# Patient Record
Sex: FEMALE | Race: BLACK OR AFRICAN AMERICAN | NOT HISPANIC OR LATINO | Employment: UNEMPLOYED | URBAN - METROPOLITAN AREA
[De-identification: names, ages, dates, MRNs, and addresses within clinical notes are randomized per-mention and may not be internally consistent; named-entity substitution may affect disease eponyms.]

---

## 2019-01-18 ENCOUNTER — HOSPITAL ENCOUNTER (EMERGENCY)
Facility: HOSPITAL | Age: 59
Discharge: HOME | End: 2019-01-18
Attending: EMERGENCY MEDICINE
Payer: MEDICAID

## 2019-01-18 VITALS
BODY MASS INDEX: 41.68 KG/M2 | SYSTOLIC BLOOD PRESSURE: 170 MMHG | TEMPERATURE: 99 F | DIASTOLIC BLOOD PRESSURE: 81 MMHG | HEIGHT: 63 IN | HEART RATE: 85 BPM | WEIGHT: 235.25 LBS | RESPIRATION RATE: 16 BRPM | OXYGEN SATURATION: 98 %

## 2019-01-18 DIAGNOSIS — R35.0 URINARY FREQUENCY: ICD-10-CM

## 2019-01-18 DIAGNOSIS — R10.84 GENERALIZED ABDOMINAL PAIN: ICD-10-CM

## 2019-01-18 DIAGNOSIS — N30.01 ACUTE CYSTITIS WITH HEMATURIA: Primary | ICD-10-CM

## 2019-01-18 DIAGNOSIS — K30 INDIGESTION: ICD-10-CM

## 2019-01-18 DIAGNOSIS — R11.0 NAUSEA: ICD-10-CM

## 2019-01-18 PROBLEM — L30.0 NUMMULAR ECZEMA: Status: ACTIVE | Noted: 2018-10-23

## 2019-01-18 PROBLEM — E11.9 TYPE 2 DIABETES MELLITUS, WITHOUT LONG-TERM CURRENT USE OF INSULIN: Status: ACTIVE | Noted: 2018-05-03

## 2019-01-18 PROBLEM — L44.1 LICHEN NITIDUS: Status: ACTIVE | Noted: 2018-10-23

## 2019-01-18 LAB
BACTERIA #/AREA URNS AUTO: ABNORMAL /HPF
BILIRUB UR QL STRIP: NEGATIVE
CLARITY UR REFRACT.AUTO: ABNORMAL
COLOR UR AUTO: YELLOW
GLUCOSE UR QL STRIP: NEGATIVE
HGB UR QL STRIP: ABNORMAL
KETONES UR QL STRIP: NEGATIVE
LEUKOCYTE ESTERASE UR QL STRIP: NEGATIVE
MICROSCOPIC COMMENT: ABNORMAL
NITRITE UR QL STRIP: NEGATIVE
PH UR STRIP: 6 [PH] (ref 5–8)
PROT UR QL STRIP: NEGATIVE
RBC #/AREA URNS AUTO: 3 /HPF (ref 0–4)
SP GR UR STRIP: 1.02 (ref 1–1.03)
SQUAMOUS #/AREA URNS AUTO: 9 /HPF
URN SPEC COLLECT METH UR: ABNORMAL
UROBILINOGEN UR STRIP-ACNC: <2 EU/DL
WBC #/AREA URNS AUTO: 2 /HPF (ref 0–5)

## 2019-01-18 PROCEDURE — 99284 EMERGENCY DEPT VISIT MOD MDM: CPT | Mod: ER

## 2019-01-18 PROCEDURE — 81000 URINALYSIS NONAUTO W/SCOPE: CPT | Mod: ER

## 2019-01-18 PROCEDURE — 25000003 PHARM REV CODE 250: Mod: ER | Performed by: NURSE PRACTITIONER

## 2019-01-18 RX ORDER — MONTELUKAST SODIUM 10 MG/1
10 TABLET ORAL DAILY
COMMUNITY
Start: 2019-01-15

## 2019-01-18 RX ORDER — PANTOPRAZOLE SODIUM 40 MG/1
40 TABLET, DELAYED RELEASE ORAL DAILY
COMMUNITY
Start: 2019-01-15 | End: 2020-01-15

## 2019-01-18 RX ORDER — HYDROXYZINE PAMOATE 25 MG/1
25 CAPSULE ORAL 3 TIMES DAILY PRN
COMMUNITY
Start: 2019-01-15 | End: 2019-02-14

## 2019-01-18 RX ORDER — FLUTICASONE PROPIONATE AND SALMETEROL 113; 14 UG/1; UG/1
1 POWDER, METERED RESPIRATORY (INHALATION) 2 TIMES DAILY
COMMUNITY
Start: 2019-01-15

## 2019-01-18 RX ORDER — AMLODIPINE BESYLATE 10 MG/1
10 TABLET ORAL DAILY
COMMUNITY
Start: 2019-01-15 | End: 2020-01-15

## 2019-01-18 RX ORDER — LOSARTAN POTASSIUM 100 MG/1
100 TABLET ORAL DAILY
COMMUNITY
Start: 2019-01-15 | End: 2020-01-15

## 2019-01-18 RX ORDER — PROMETHAZINE HYDROCHLORIDE 25 MG/1
25 TABLET ORAL EVERY 6 HOURS PRN
Qty: 15 TABLET | Refills: 0 | Status: SHIPPED | OUTPATIENT
Start: 2019-01-18

## 2019-01-18 RX ORDER — NITROFURANTOIN 25; 75 MG/1; MG/1
100 CAPSULE ORAL 2 TIMES DAILY
Qty: 10 CAPSULE | Refills: 0 | Status: SHIPPED | OUTPATIENT
Start: 2019-01-18 | End: 2019-01-23

## 2019-01-18 RX ORDER — ALBUTEROL SULFATE 90 UG/1
2 AEROSOL, METERED RESPIRATORY (INHALATION) EVERY 6 HOURS PRN
COMMUNITY
Start: 2019-01-15

## 2019-01-18 RX ORDER — DICLOFENAC SODIUM 10 MG/G
2 GEL TOPICAL 4 TIMES DAILY PRN
COMMUNITY
Start: 2019-01-15 | End: 2019-10-18

## 2019-01-18 RX ORDER — CHLORTHALIDONE 25 MG/1
25 TABLET ORAL DAILY
COMMUNITY
Start: 2019-01-15 | End: 2020-01-15

## 2019-01-18 RX ORDER — LORATADINE 10 MG/1
10 TABLET ORAL DAILY PRN
COMMUNITY
Start: 2019-01-15 | End: 2020-01-15

## 2019-01-18 RX ADMIN — DICYCLOMINE HYDROCHLORIDE 50 ML: 10 SOLUTION ORAL at 01:01

## 2019-01-18 NOTE — ED PROVIDER NOTES
"Encounter Date: 1/18/2019       History     Chief Complaint   Patient presents with    Abdominal Pain     Onset last night. Generalized pain. Denies N/V/D.      The history is provided by the patient.   Abdominal Pain   The current episode started yesterday (began last night). The onset of the illness was gradual. The problem has not changed since onset.The abdominal pain is generalized. The abdominal pain radiates to the epigastric region. The severity of the abdominal pain is 6/10. The abdominal pain is relieved by nothing. The other symptoms of the illness include nausea. The other symptoms of the illness do not include fever, shortness of breath, vomiting, diarrhea or dysuria.   Nausea began yesterday (since last night).     The patient has not had a change in bowel habit. Additional symptoms associated with the illness include heartburn and frequency. Symptoms associated with the illness do not include chills, anorexia, diaphoresis, constipation, hematuria or back pain. Significant associated medical issues include GERD and diabetes. Significant associated medical issues do not include inflammatory bowel disease, liver disease, diverticulitis or cardiac disease.   Last bowel movement was last night and she reports that it was "normal".  Last oral intake was last night for dinner in which she had a hot dog and fries.  She denies any further complaints or concerns.       PCP:     [facility]      Review of patient's allergies indicates:   Allergen Reactions    Latex, natural rubber Rash    Doxycycline Hives     Past Medical History:   Diagnosis Date    Asthma     Dyshidrotic eczema 7/29/2015    GERD (gastroesophageal reflux disease)     Hyperlipidemia     Hypertension     Lichen nitidus 10/23/2018    Obesity     Osteoarthritis 2/11/2016    Type 2 diabetes mellitus, without long-term current use of insulin 5/3/2018     History reviewed. No pertinent surgical history.  History reviewed. No " pertinent family history.  Social History     Tobacco Use    Smoking status: Never Smoker    Smokeless tobacco: Never Used   Substance Use Topics    Alcohol use: No    Drug use: No     Review of Systems   Constitutional: Negative for chills, diaphoresis and fever.   HENT: Negative for congestion, postnasal drip and sore throat.    Eyes: Negative for visual disturbance.   Respiratory: Negative for cough, chest tightness, shortness of breath and wheezing.    Cardiovascular: Negative for chest pain and palpitations.   Gastrointestinal: Positive for abdominal pain, heartburn and nausea. Negative for anorexia, constipation, diarrhea and vomiting.        Positive for indigestion.   Genitourinary: Positive for frequency. Negative for difficulty urinating, dysuria and hematuria.   Musculoskeletal: Negative for back pain and neck pain.   Skin: Negative for color change and rash.   Neurological: Positive for headaches. Negative for dizziness, weakness and light-headedness.   Hematological: Does not bruise/bleed easily.       Physical Exam     Initial Vitals [01/18/19 1305]   BP Pulse Resp Temp SpO2   (!) 157/78 87 16 99.2 °F (37.3 °C) 99 %      MAP       --         Physical Exam    Nursing note and vitals reviewed.  Constitutional: She appears well-developed and well-nourished. She is Obese . She is cooperative. She does not appear ill. No distress.   HENT:   Head: Normocephalic and atraumatic.   Right Ear: Hearing, tympanic membrane, external ear and ear canal normal.   Left Ear: Hearing, tympanic membrane, external ear and ear canal normal.   Nose: Nose normal.   Mouth/Throat: Uvula is midline, oropharynx is clear and moist and mucous membranes are normal.   Eyes: Conjunctivae, EOM and lids are normal. Pupils are equal, round, and reactive to light.   Neck: Trachea normal and normal range of motion. Neck supple.   Cardiovascular: Normal rate, regular rhythm, intact distal pulses and normal pulses.   Pulmonary/Chest:  Effort normal and breath sounds normal. No accessory muscle usage. No respiratory distress. She has no wheezes. She has no rhonchi. She has no rales.   Abdominal: Soft. Bowel sounds are normal. She exhibits no distension and no mass. There is no tenderness (no reproducible tenderness on palpation - patient has subjective complaints of indigestion and generalized abdominal discomfort). There is no rigidity, no rebound, no guarding and no CVA tenderness. No hernia.   Musculoskeletal: Normal range of motion. She exhibits no edema or tenderness.   Neurological: She is alert and oriented to person, place, and time. She has normal strength. No sensory deficit. Gait normal. GCS eye subscore is 4. GCS verbal subscore is 5. GCS motor subscore is 6.   Neurovascular intact to all extremities.    Skin: Skin is warm, dry and intact. Capillary refill takes less than 2 seconds. No rash noted.   Normal color and turgor.    Psychiatric: She has a normal mood and affect. Her speech is normal and behavior is normal. Cognition and memory are normal.         ED Course   Procedures     ED Abnormal Lab Results:   Labs Reviewed   URINALYSIS - Abnormal; Notable for the following components:       Result Value    Appearance, UA Hazy (*)     Occult Blood UA 1+ (*)     All other components within normal limits   URINALYSIS MICROSCOPIC - Abnormal; Notable for the following components:    Bacteria, UA Moderate (*)     All other components within normal limits       ED Lab Results:   Results for orders placed or performed during the hospital encounter of 01/18/19   Urinalysis Clean Catch   Result Value Ref Range    Specimen UA Urine, Clean Catch     Color, UA Yellow Yellow, Straw, Aruna    Appearance, UA Hazy (A) Clear    pH, UA 6.0 5.0 - 8.0    Specific Gravity, UA 1.020 1.005 - 1.030    Protein, UA Negative Negative    Glucose, UA Negative Negative    Ketones, UA Negative Negative    Bilirubin (UA) Negative Negative    Occult Blood UA 1+ (A)  "Negative    Nitrite, UA Negative Negative    Urobilinogen, UA <2.0 <2.0 EU/dL    Leukocytes, UA Negative Negative   Urinalysis Microscopic   Result Value Ref Range    RBC, UA 3 0 - 4 /hpf    WBC, UA 2 0 - 5 /hpf    Bacteria, UA Moderate (A) None-Occ /hpf    Squam Epithel, UA 9 /hpf    Microscopic Comment SEE COMMENT        ED Medications:   Medications   GI cocktail (mylanta 30 mL, lidocaine 2 % viscous 10 mL, dicyclomine 10 mL) 50 mL (50 mLs Oral Given 1/18/19 1358)         ED Course Vitals  Vitals:    01/18/19 1305   BP: (!) 157/78   BP Location: Left arm   Patient Position: Sitting   Pulse: 87   Resp: 16   Temp: 99.2 °F (37.3 °C)   TempSrc: Oral   SpO2: 99%   Weight: 106.7 kg (235 lb 3.7 oz)   Height: 5' 3" (1.6 m)         1420 HOURS RE-EVALUATION & DISPOSITION:   Reassessment at the time of disposition demonstrates that the patient is resting comfortably in no acute distress. Ms. Penaloza reports that she is feeling much better following administration of the GI Cocktail.  She has remained hemodynamically stable throughout the entire ED visit and is without objective evidence for acute process requiring urgent intervention or hospitalization. I discussed test results and provided counseling to patient with regard to condition, the treatment plan, specific conditions for return, and the importance of follow up.  Answered questions at this time. The patient is stable for discharge.                   Medical Decision Making:   History:   Old Records Summarized: records from clinic visits.  Clinical Tests:   Lab Tests: Ordered and Reviewed                      Clinical Impression:       ICD-10-CM ICD-9-CM   1. Acute cystitis with hematuria N30.01 595.0   2. Generalized abdominal pain R10.84 789.07   3. Urinary frequency R35.0 788.41   4. Indigestion K30 536.8   5. Nausea R11.0 787.02           Disposition:   Disposition: Discharged  Condition: Stable  I discussed with patient that the evaluation in the emergency " department does not suggest any emergent or life threatening medical condition requiring immediate intervention beyond what was provided in the ED, and I believe patient is safe for discharge.  Regardless, an unremarkable evaluation in the ED does not preclude the development or presence of a serious of life threatening condition. As such, patient was instructed to return immediately for any worsening or change in current symptoms. I also discussed the results of my evaluation and diagnosis with patient and she concurs with the evaluation and management plan.  Detailed written and verbal instructions provided to patient and she expressed a verbal understanding of the discharge instructions and overall management plan. Reiterated the importance of medication administration and safety and advised patient to follow up with primary care provider in 3-5 days or sooner if needed.  Also advised patient to return to the ER for any complications.     Regarding ABDOMINAL PAIN, I recommended that the patient: Sip water or other clear fluids; avoid solid food for the first few hours after vomiting or diarrhea; if vomiting, wait 6 hours, and then eat small amounts of mild foods such as rice, applesauce, or crackers; avoid dairy products; avoid citrus, high-fat foods, fried or greasy foods, tomato products, caffeine, alcohol, and carbonated beverages;  avoid aspirin, ibuprofen or other anti-inflammatory medications, and narcotic pain medications unless prescribed.  In regards to prevention, I encouraged patient to:  Avoid fatty or greasy foods; drink plenty of water each day; eat small meals more frequently; exercise regularly; limit foods that produce gas; make sure meals are well-balanced and high in fiber and include plenty of fruits and vegetables.    For URINARY TRACT INFECTION, I instructed patient to avoid holding in urine and recommended that she urinate when she feels the urge.  Also advised patient to drink plenty of  liquids and reminded patient that she may need to drink more fluids than usual to help flush out the bacteria. Instructed patient to avoid alcohol, caffeine, and citrus juices as these substances can irritate your bladder and increase your symptoms.  Also recommended that patient apply heat to lower abdomen for 20 to 30 minutes every two hours to help decrease discomfort and pressure in the bladder.    Regarding GERD/INDIGESTION, I recommended that the patient: maintain a healthy weight; avoid lying down after meals; avoid eating late at night; elevate the head of the bed by 6 inches; avoid wearing tight-fitting clothes; avoid foods that irritate the stomach (alcohol, fat, chocolate, caffeine, and spearmint or peppermint); talk to primary care provider if taking any medications that can make GERD symptoms worse (calcium channel blockers, theophylline, and anticholinergic medications); begin an exercise program; stop-smoking if applicable; limit alcohol intake to no more than 2 drinks a day; take medications exactly as directed; and avoid over-the-counter nonsteroidal anti-inflammatory drugs, such as aspirin and ibuprofen. Instructed patient to contact primary care provider or return to the emergency department if any of the following signs or symptoms are present: trouble swallowing; pain when swallowing; feeling of food caught in chest or throat; pain in the neck, chest, or back; heartburn that causes emesis; hematemesis; black or tarry stools; increase in salivation; weight loss of more than 3% to 5% of total body weight in a month; hoarseness or sore throat that won't go away; and choking, coughing, or wheezing.    For NAUSEA/VOMITING, I advised patient on importance of staying well hydrated; eating frequent, small amounts of clear liquids; avoid solid foods until there has been no vomiting for six hours, and then work slowly back to a normal diet; and to use an OTC bismuth stomach remedy for upset stomach,  nausea, indigestion, and diarrhea. We discussed signs and symptoms of dehydration and possible causes of N/V with patient (viral infections, medications, migraine headaches, food poisoning, allergies, and peptic ulcer disease).  Reiterated the importance of following up with primary care provider if no improvement is noted within 72 hours.              Follow-up Information     Schedule an appointment as soon as possible for a visit  with PRIMARY CARE PROVIDER.    Why:  For follow up                 Current Discharge Medication List      START taking these medications    Details   nitrofurantoin, macrocrystal-monohydrate, (MACROBID) 100 MG capsule Take 1 capsule (100 mg total) by mouth 2 (two) times daily. for 5 days  Qty: 10 capsule, Refills: 0      promethazine (PHENERGAN) 25 MG tablet Take 1 tablet (25 mg total) by mouth every 6 (six) hours as needed for Nausea.  Qty: 15 tablet, Refills: 0        Ankit Nash NP  01/18/19 8862

## 2019-01-29 ENCOUNTER — HOSPITAL ENCOUNTER (EMERGENCY)
Facility: HOSPITAL | Age: 59
Discharge: HOME | End: 2019-01-29
Attending: EMERGENCY MEDICINE
Payer: MEDICAID

## 2019-01-29 VITALS
WEIGHT: 242.06 LBS | DIASTOLIC BLOOD PRESSURE: 91 MMHG | HEART RATE: 104 BPM | HEIGHT: 63 IN | BODY MASS INDEX: 42.89 KG/M2 | SYSTOLIC BLOOD PRESSURE: 184 MMHG | TEMPERATURE: 100 F | RESPIRATION RATE: 20 BRPM | OXYGEN SATURATION: 99 %

## 2019-01-29 DIAGNOSIS — R05.9 COUGH: Primary | ICD-10-CM

## 2019-01-29 DIAGNOSIS — R09.81 NASAL CONGESTION: ICD-10-CM

## 2019-01-29 LAB
INFLUENZA A, MOLECULAR: NEGATIVE
INFLUENZA B, MOLECULAR: NEGATIVE
SPECIMEN SOURCE: NORMAL

## 2019-01-29 PROCEDURE — 94761 N-INVAS EAR/PLS OXIMETRY MLT: CPT | Mod: ER

## 2019-01-29 PROCEDURE — 99900035 HC TECH TIME PER 15 MIN (STAT): Mod: ER

## 2019-01-29 PROCEDURE — 25000242 PHARM REV CODE 250 ALT 637 W/ HCPCS: Mod: ER | Performed by: PHYSICIAN ASSISTANT

## 2019-01-29 PROCEDURE — 94760 N-INVAS EAR/PLS OXIMETRY 1: CPT | Mod: ER,59

## 2019-01-29 PROCEDURE — 87502 INFLUENZA DNA AMP PROBE: CPT | Mod: ER

## 2019-01-29 PROCEDURE — 99284 EMERGENCY DEPT VISIT MOD MDM: CPT | Mod: ER

## 2019-01-29 PROCEDURE — 94640 AIRWAY INHALATION TREATMENT: CPT | Mod: ER

## 2019-01-29 RX ORDER — FLUTICASONE PROPIONATE 50 MCG
2 SPRAY, SUSPENSION (ML) NASAL DAILY
Qty: 15 G | Refills: 0 | Status: SHIPPED | OUTPATIENT
Start: 2019-01-29

## 2019-01-29 RX ORDER — BENZONATATE 100 MG/1
100 CAPSULE ORAL 3 TIMES DAILY PRN
Qty: 12 CAPSULE | Refills: 0 | Status: SHIPPED | OUTPATIENT
Start: 2019-01-29 | End: 2019-02-08

## 2019-01-29 RX ORDER — IPRATROPIUM BROMIDE AND ALBUTEROL SULFATE 2.5; .5 MG/3ML; MG/3ML
3 SOLUTION RESPIRATORY (INHALATION)
Status: COMPLETED | OUTPATIENT
Start: 2019-01-29 | End: 2019-01-29

## 2019-01-29 RX ADMIN — IPRATROPIUM BROMIDE AND ALBUTEROL SULFATE 3 ML: .5; 3 SOLUTION RESPIRATORY (INHALATION) at 06:01

## 2019-01-30 NOTE — ED PROVIDER NOTES
History      Chief Complaint   Patient presents with    Nasal Congestion     nasal congestion and asthma flare up today. neb and inhaler today    Cough       Review of patient's allergies indicates:   Allergen Reactions    Latex, natural rubber Rash    Doxycycline Hives        HPI   HPI    1/29/2019, 7:38 PM   History obtained from the patient       History of Present Illness: Elaine Penaloza is a 58 y.o. female patient who presents to the Emergency Department for cough and nasal congestion with subj fever since last night. Pmh asthma.  Denies n/v or sob.  Symptoms are moderate in severity.     No further complaints or concerns at this time.           PCP: Primary Doctor No       Past Medical History:  Past Medical History:   Diagnosis Date    Asthma     Dyshidrotic eczema 7/29/2015    GERD (gastroesophageal reflux disease)     Hyperlipidemia     Hypertension     Lichen nitidus 10/23/2018    Obesity     Osteoarthritis 2/11/2016    Type 2 diabetes mellitus, without long-term current use of insulin 5/3/2018         Past Surgical History:  No past surgical history on file.        Family History:  No family history on file.        Social History:  Social History     Tobacco Use    Smoking status: Never Smoker    Smokeless tobacco: Never Used   Substance and Sexual Activity    Alcohol use: No    Drug use: No    Sexual activity: Not on file       ROS     Review of Systems   Constitutional: Positive for chills. Negative for fever.   HENT: Negative for sore throat.    Respiratory: Positive for cough and wheezing. Negative for shortness of breath.    Cardiovascular: Negative for chest pain, palpitations and leg swelling.   Gastrointestinal: Negative for nausea and vomiting.   Genitourinary: Negative for dysuria.   Musculoskeletal: Negative for back pain.   Skin: Negative for rash.   Neurological: Negative for syncope and weakness.   Hematological: Does not bruise/bleed easily.   All other systems  "reviewed and are negative.      Physical Exam      Initial Vitals [01/29/19 1753]   BP Pulse Resp Temp SpO2   (!) 184/91 (!) 115 20 99.5 °F (37.5 °C) 100 %      MAP       --         Physical Exam  Vital signs and nursing notes reviewed.  Constitutional: Patient is in NAD. Awake and alert. Well-developed and well-nourished.  Head: Atraumatic. Normocephalic.  Eyes: PERRL. EOM intact. Conjunctivae nl. No scleral icterus.  ENT: Mucous membranes are moist. Oropharynx is clear.  Neck: Supple. No JVD. No lymphadenopathy.  No meningismus  Cardiovascular: Regular rate and rhythm. No murmurs, rubs, or gallops. Distal pulses are 2+ and symmetric.  Pulmonary/Chest: No respiratory distress. Trace exp wheeze.  No rales, or rhonchi.  Abdominal: Soft. Non-distended. No TTP. No rebound, guarding, or rigidity. Good bowel sounds.  Genitourinary: No CVA tenderness  Musculoskeletal: Moves all extremities. No edema.   Skin: Warm and dry.  Neurological: Awake and alert. No acute focal neurological deficits are appreciated.  Psychiatric: Normal affect. Good eye contact. Appropriate in content.      ED Course          Procedures  ED Vital Signs:  Vitals:    01/29/19 1753 01/29/19 1847   BP: (!) 184/91    Pulse: (!) 115 104   Resp: 20 20   Temp: 99.5 °F (37.5 °C)    TempSrc: Oral    SpO2: 100% 99%   Weight: 109.8 kg (242 lb 1 oz)    Height: 5' 3" (1.6 m)          Results for orders placed or performed during the hospital encounter of 01/29/19   Influenza A & B by Molecular   Result Value Ref Range    Influenza A, Molecular Negative Negative    Influenza B, Molecular Negative Negative    Flu A & B Source NP              Imaging Results:  Imaging Results          X-Ray Chest PA And Lateral (Final result)  Result time 01/29/19 19:27:50    Final result by Vamsi Mcdaniel III, MD (01/29/19 19:27:50)                 Impression:      Minimal right perihilar atelectasis.  No other significant findings.      Electronically signed by: Vamsi " MD Violeta  Date:    01/29/2019  Time:    19:27             Narrative:    EXAMINATION:  XR CHEST PA AND LATERAL    CLINICAL HISTORY:  Cough    COMPARISON:  July 2016    FINDINGS:  Suspect minimal atelectatic change in the right perihilar zone.  Lungs are otherwise clear with normal heart size.                                   The Emergency Provider reviewed the vital signs and test results, which are outlined above.    ED Discussion             Medication(s) given in the ER:  Medications   albuterol-ipratropium 2.5 mg-0.5 mg/3 mL nebulizer solution 3 mL (3 mLs Nebulization Given 1/29/19 1289)           Follow-up Information     [facility] In 2 days.    Contact information:  [address] [facility] [address] [phone]                          Medication List      START taking these medications    benzonatate 100 MG capsule  Commonly known as:  TESSALON  Take 1 capsule (100 mg total) by mouth 3 (three) times daily as needed for Cough.     fluticasone 50 mcg/actuation nasal spray  Commonly known as:  FLONASE  2 sprays (100 mcg total) by Each Nare route once daily.        ASK your doctor about these medications    albuterol 90 mcg/actuation inhaler  Commonly known as:  PROVENTIL/VENTOLIN HFA     amLODIPine 10 MG tablet  Commonly known as:  NORVASC     atorvastatin 20 MG tablet  Commonly known as:  LIPITOR     chlorthalidone 25 MG Tab  Commonly known as:  HYGROTEN     diclofenac sodium 1 % Gel  Commonly known as:  VOLTAREN     fluticasone-salmeterol 113-14 mcg/actuation Aepb     hydrOXYzine pamoate 25 MG Cap  Commonly known as:  VISTARIL     linagliptin 5 mg Tab tablet  Commonly known as:  TRADJENTA     loratadine 10 mg tablet  Commonly known as:  CLARITIN     losartan 100 MG tablet  Commonly known as:  COZAAR     montelukast 10 mg tablet  Commonly known as:  SINGULAIR     pantoprazole 40 MG tablet  Commonly known as:  PROTONIX     promethazine 25 MG tablet  Commonly known as:   PHENERGAN  Take 1 tablet (25 mg total) by mouth every 6 (six) hours as needed for Nausea.     tazarotene 0.05 % Crea cream  Commonly known as:  TAZORAC           Where to Get Your Medications      You can get these medications from any pharmacy    Bring a paper prescription for each of these medications  · benzonatate 100 MG capsule  · fluticasone 50 mcg/actuation nasal spray             Medical Decision Making      Breath sounds much improved since neb treatment.  Pt ready for discharge.    All findings were reviewed with the patient/family in detail.   All remaining questions and concerns were addressed at that time.  Patient/family has been counseled regarding the need for follow-up as well as the indication to return to the emergency room should new or worrisome developments occur.        MDM               Clinical Impression:        ICD-10-CM ICD-9-CM   1. Cough R05 786.2   2. Nasal congestion R09.81 478.19             Shala Mcleod PA-C  01/29/19 2147

## 2019-01-30 NOTE — ED NOTES
Aaox3, skin warm and dry, resp unlabored and even. amb with steady gait . C/o increase cough and congestion today.

## 2019-01-30 NOTE — ED NOTES
Patient examined, evaluated, and educated on discharge prescriptions and instructions by Shala ROTHMAN. Patient discharged to [facility] by LORNA.

## 2019-06-16 ENCOUNTER — HOSPITAL ENCOUNTER (EMERGENCY)
Facility: HOSPITAL | Age: 59
Discharge: HOME | End: 2019-06-16
Attending: EMERGENCY MEDICINE
Payer: MEDICAID

## 2019-06-16 VITALS
DIASTOLIC BLOOD PRESSURE: 68 MMHG | TEMPERATURE: 98 F | SYSTOLIC BLOOD PRESSURE: 132 MMHG | WEIGHT: 226.19 LBS | OXYGEN SATURATION: 99 % | RESPIRATION RATE: 19 BRPM | BODY MASS INDEX: 38.62 KG/M2 | HEART RATE: 77 BPM | HEIGHT: 64 IN

## 2019-06-16 DIAGNOSIS — E87.6 HYPOKALEMIA: ICD-10-CM

## 2019-06-16 DIAGNOSIS — R19.7 DIARRHEA, UNSPECIFIED TYPE: Primary | ICD-10-CM

## 2019-06-16 DIAGNOSIS — R53.83 FATIGUE: ICD-10-CM

## 2019-06-16 LAB
ALBUMIN SERPL BCP-MCNC: 3.6 G/DL (ref 3.5–5.2)
ALP SERPL-CCNC: 127 U/L (ref 55–135)
ALT SERPL W/O P-5'-P-CCNC: 26 U/L (ref 10–44)
ANION GAP SERPL CALC-SCNC: 12 MMOL/L (ref 8–16)
AST SERPL-CCNC: 19 U/L (ref 10–40)
BASOPHILS # BLD AUTO: 0.01 K/UL (ref 0–0.2)
BASOPHILS NFR BLD: 0.1 % (ref 0–1.9)
BILIRUB SERPL-MCNC: 0.8 MG/DL (ref 0.1–1)
BILIRUB UR QL STRIP: NEGATIVE
BUN SERPL-MCNC: 19 MG/DL (ref 6–20)
CALCIUM SERPL-MCNC: 9.1 MG/DL (ref 8.7–10.5)
CHLORIDE SERPL-SCNC: 103 MMOL/L (ref 95–110)
CLARITY UR REFRACT.AUTO: CLEAR
CO2 SERPL-SCNC: 27 MMOL/L (ref 23–29)
COLOR UR AUTO: YELLOW
CREAT SERPL-MCNC: 1.5 MG/DL (ref 0.5–1.4)
DIFFERENTIAL METHOD: NORMAL
EOSINOPHIL # BLD AUTO: 0.5 K/UL (ref 0–0.5)
EOSINOPHIL NFR BLD: 5.5 % (ref 0–8)
ERYTHROCYTE [DISTWIDTH] IN BLOOD BY AUTOMATED COUNT: 12.7 % (ref 11.5–14.5)
EST. GFR  (AFRICAN AMERICAN): 44.3 ML/MIN/1.73 M^2
EST. GFR  (NON AFRICAN AMERICAN): 38.4 ML/MIN/1.73 M^2
GLUCOSE SERPL-MCNC: 246 MG/DL (ref 70–110)
GLUCOSE UR QL STRIP: NEGATIVE
HCT VFR BLD AUTO: 38.6 % (ref 37–48.5)
HGB BLD-MCNC: 13 G/DL (ref 12–16)
HGB UR QL STRIP: NEGATIVE
KETONES UR QL STRIP: NEGATIVE
LEUKOCYTE ESTERASE UR QL STRIP: NEGATIVE
LYMPHOCYTES # BLD AUTO: 3.2 K/UL (ref 1–4.8)
LYMPHOCYTES NFR BLD: 35 % (ref 18–48)
MCH RBC QN AUTO: 29.1 PG (ref 27–31)
MCHC RBC AUTO-ENTMCNC: 33.7 G/DL (ref 32–36)
MCV RBC AUTO: 87 FL (ref 82–98)
MONOCYTES # BLD AUTO: 0.5 K/UL (ref 0.3–1)
MONOCYTES NFR BLD: 5.6 % (ref 4–15)
NEUTROPHILS # BLD AUTO: 5 K/UL (ref 1.8–7.7)
NEUTROPHILS NFR BLD: 53.6 % (ref 38–73)
NITRITE UR QL STRIP: NEGATIVE
PH UR STRIP: 6 [PH] (ref 5–8)
PLATELET # BLD AUTO: 240 K/UL (ref 150–350)
PMV BLD AUTO: 10.8 FL (ref 9.2–12.9)
POTASSIUM SERPL-SCNC: 2.8 MMOL/L (ref 3.5–5.1)
PROT SERPL-MCNC: 7.3 G/DL (ref 6–8.4)
PROT UR QL STRIP: NEGATIVE
RBC # BLD AUTO: 4.46 M/UL (ref 4–5.4)
SODIUM SERPL-SCNC: 142 MMOL/L (ref 136–145)
SP GR UR STRIP: <=1.005 (ref 1–1.03)
TROPONIN I SERPL DL<=0.01 NG/ML-MCNC: <0.006 NG/ML (ref 0–0.03)
URN SPEC COLLECT METH UR: ABNORMAL
UROBILINOGEN UR STRIP-ACNC: NEGATIVE EU/DL
WBC # BLD AUTO: 9.26 K/UL (ref 3.9–12.7)

## 2019-06-16 PROCEDURE — 96360 HYDRATION IV INFUSION INIT: CPT | Mod: ER

## 2019-06-16 PROCEDURE — 81003 URINALYSIS AUTO W/O SCOPE: CPT | Mod: ER

## 2019-06-16 PROCEDURE — 93010 ELECTROCARDIOGRAM REPORT: CPT | Mod: ,,, | Performed by: INTERNAL MEDICINE

## 2019-06-16 PROCEDURE — 93010 EKG 12-LEAD: ICD-10-PCS | Mod: ,,, | Performed by: INTERNAL MEDICINE

## 2019-06-16 PROCEDURE — 85025 COMPLETE CBC W/AUTO DIFF WBC: CPT | Mod: ER

## 2019-06-16 PROCEDURE — 25000003 PHARM REV CODE 250: Mod: ER | Performed by: REGISTERED NURSE

## 2019-06-16 PROCEDURE — 99900035 HC TECH TIME PER 15 MIN (STAT): Mod: ER

## 2019-06-16 PROCEDURE — 84484 ASSAY OF TROPONIN QUANT: CPT | Mod: ER

## 2019-06-16 PROCEDURE — 86703 HIV-1/HIV-2 1 RESULT ANTBDY: CPT

## 2019-06-16 PROCEDURE — 99284 EMERGENCY DEPT VISIT MOD MDM: CPT | Mod: 25,ER

## 2019-06-16 PROCEDURE — 80053 COMPREHEN METABOLIC PANEL: CPT | Mod: ER

## 2019-06-16 PROCEDURE — 93005 ELECTROCARDIOGRAM TRACING: CPT | Mod: ER

## 2019-06-16 PROCEDURE — 86803 HEPATITIS C AB TEST: CPT

## 2019-06-16 RX ORDER — POTASSIUM CHLORIDE 20 MEQ/1
60 TABLET, EXTENDED RELEASE ORAL
Status: COMPLETED | OUTPATIENT
Start: 2019-06-16 | End: 2019-06-16

## 2019-06-16 RX ORDER — POTASSIUM CHLORIDE 20 MEQ/1
20 TABLET, EXTENDED RELEASE ORAL 2 TIMES DAILY
Qty: 14 TABLET | Refills: 0 | Status: SHIPPED | OUTPATIENT
Start: 2019-06-16 | End: 2019-06-23

## 2019-06-16 RX ADMIN — SODIUM CHLORIDE 1000 ML: 0.9 INJECTION, SOLUTION INTRAVENOUS at 03:06

## 2019-06-16 RX ADMIN — POTASSIUM CHLORIDE 60 MEQ: 1500 TABLET, EXTENDED RELEASE ORAL at 04:06

## 2019-06-16 NOTE — ED PROVIDER NOTES
HISTORY     Chief Complaint   Patient presents with    Fatigue     Reports intermittent episodes of diarrhea. +abd cramping. Denies any episodes of vomiting     Review of patient's allergies indicates:   Allergen Reactions    Latex, natural rubber Rash    Doxycycline Hives        HPI   The history is provided by the patient.   Diarrhea    This is a new problem. The current episode started two days ago. The problem occurs 5 to 10 times per day. The problem has been unchanged. The patient states that diarrhea does not awaken her from sleep. Pertinent negatives include no abdominal pain, fever or vomiting. Risk factors include suspect food intake. She has tried nothing for the symptoms.   Pt reports fatigue after having diarrhea over the last 2 days, weakness with standing and SOB with walking. Pt denies any NV, HA, chest pain or any other symptoms at this time.     PCP: [facility]     Past Medical History:  Past Medical History:   Diagnosis Date    Asthma     Dyshidrotic eczema 7/29/2015    GERD (gastroesophageal reflux disease)     Hyperlipidemia     Hypertension     Lichen nitidus 10/23/2018    Obesity     Osteoarthritis 2/11/2016    Type 2 diabetes mellitus, without long-term current use of insulin 5/3/2018        Past Surgical History:  History reviewed. No pertinent surgical history.     Family History:  History reviewed. No pertinent family history.     Social History:  Social History     Tobacco Use    Smoking status: Never Smoker    Smokeless tobacco: Never Used   Substance and Sexual Activity    Alcohol use: No    Drug use: No    Sexual activity: Not on file         ROS   Review of Systems   Constitutional: Positive for fatigue. Negative for fever.   HENT: Negative for sore throat.    Respiratory: Negative for shortness of breath.    Cardiovascular: Negative for chest pain.   Gastrointestinal: Positive for diarrhea. Negative for abdominal pain, nausea and vomiting.  "  Genitourinary: Negative for dysuria.   Musculoskeletal: Negative for back pain.   Skin: Negative for rash.   Neurological: Positive for weakness. Negative for syncope.   Hematological: Does not bruise/bleed easily.   All other systems reviewed and are negative.      PHYSICAL EXAM     Initial Vitals [06/16/19 1510]   BP Pulse Resp Temp SpO2   (!) 144/68 92 17 97.8 °F (36.6 °C) 97 %      MAP       --           Physical Exam    Constitutional: She appears well-developed and well-nourished. She is not diaphoretic. No distress.   HENT:   Head: Normocephalic and atraumatic.   Eyes: Conjunctivae and EOM are normal. Pupils are equal, round, and reactive to light.   Neck: Normal range of motion. Neck supple.   Cardiovascular: Normal rate, regular rhythm and normal heart sounds.   No murmur heard.  Pulmonary/Chest: Breath sounds normal. No respiratory distress. She has no wheezes. She has no rales.   Abdominal: Soft. Bowel sounds are normal. There is no tenderness. There is no rebound and no guarding.   Musculoskeletal: Normal range of motion. She exhibits no edema or tenderness.   Neurological: She is alert and oriented to person, place, and time. No cranial nerve deficit. GCS score is 15. GCS eye subscore is 4. GCS verbal subscore is 5. GCS motor subscore is 6.   Skin: Skin is warm and dry. Capillary refill takes less than 2 seconds.   Psychiatric: She has a normal mood and affect. Thought content normal.          ED COURSE   Procedures  ED ONGOING VITALS:  Vitals:    06/16/19 1510 06/16/19 1544 06/16/19 1545 06/16/19 1546   BP: (!) 144/68 129/60 129/65 (!) 119/59   Pulse: 92 90 92 94   Resp: 17      Temp: 97.8 °F (36.6 °C)      TempSrc: Oral      SpO2: 97%      Weight: 102.6 kg (226 lb 3.1 oz)      Height: 5' 4" (1.626 m)            ABNORMAL LAB VALUES:  Labs Reviewed   COMPREHENSIVE METABOLIC PANEL - Abnormal; Notable for the following components:       Result Value    Potassium 2.8 (*)     Glucose 246 (*)     " Creatinine 1.5 (*)     eGFR if  44.3 (*)     eGFR if non  38.4 (*)     All other components within normal limits   URINALYSIS, REFLEX TO URINE CULTURE - Abnormal; Notable for the following components:    Specific Gravity, UA <=1.005 (*)     All other components within normal limits    Narrative:     Preferred Collection Type->Urine, Clean Catch   CBC W/ AUTO DIFFERENTIAL   TROPONIN I   HIV 1 / 2 ANTIBODY   HEPATITIS C ANTIBODY         ALL LAB VALUES:  Results for orders placed or performed during the hospital encounter of 06/16/19   CBC auto differential   Result Value Ref Range    WBC 9.26 3.90 - 12.70 K/uL    RBC 4.46 4.00 - 5.40 M/uL    Hemoglobin 13.0 12.0 - 16.0 g/dL    Hematocrit 38.6 37.0 - 48.5 %    Mean Corpuscular Volume 87 82 - 98 fL    Mean Corpuscular Hemoglobin 29.1 27.0 - 31.0 pg    Mean Corpuscular Hemoglobin Conc 33.7 32.0 - 36.0 g/dL    RDW 12.7 11.5 - 14.5 %    Platelets 240 150 - 350 K/uL    MPV 10.8 9.2 - 12.9 fL    Gran # (ANC) 5.0 1.8 - 7.7 K/uL    Lymph # 3.2 1.0 - 4.8 K/uL    Mono # 0.5 0.3 - 1.0 K/uL    Eos # 0.5 0.0 - 0.5 K/uL    Baso # 0.01 0.00 - 0.20 K/uL    Gran% 53.6 38.0 - 73.0 %    Lymph% 35.0 18.0 - 48.0 %    Mono% 5.6 4.0 - 15.0 %    Eosinophil% 5.5 0.0 - 8.0 %    Basophil% 0.1 0.0 - 1.9 %    Differential Method Automated    Comprehensive metabolic panel   Result Value Ref Range    Sodium 142 136 - 145 mmol/L    Potassium 2.8 (L) 3.5 - 5.1 mmol/L    Chloride 103 95 - 110 mmol/L    CO2 27 23 - 29 mmol/L    Glucose 246 (H) 70 - 110 mg/dL    BUN, Bld 19 6 - 20 mg/dL    Creatinine 1.5 (H) 0.5 - 1.4 mg/dL    Calcium 9.1 8.7 - 10.5 mg/dL    Total Protein 7.3 6.0 - 8.4 g/dL    Albumin 3.6 3.5 - 5.2 g/dL    Total Bilirubin 0.8 0.1 - 1.0 mg/dL    Alkaline Phosphatase 127 55 - 135 U/L    AST 19 10 - 40 U/L    ALT 26 10 - 44 U/L    Anion Gap 12 8 - 16 mmol/L    eGFR if African American 44.3 (A) >60 mL/min/1.73 m^2    eGFR if non  38.4 (A) >60  mL/min/1.73 m^2   Urinalysis, Reflex to Urine Culture Urine, Clean Catch   Result Value Ref Range    Specimen UA Urine, Clean Catch     Color, UA Yellow Yellow, Straw, Aruna    Appearance, UA Clear Clear    pH, UA 6.0 5.0 - 8.0    Specific Gravity, UA <=1.005 (A) 1.005 - 1.030    Protein, UA Negative Negative    Glucose, UA Negative Negative    Ketones, UA Negative Negative    Bilirubin (UA) Negative Negative    Occult Blood UA Negative Negative    Nitrite, UA Negative Negative    Urobilinogen, UA Negative <2.0 EU/dL    Leukocytes, UA Negative Negative   Troponin I   Result Value Ref Range    Troponin I <0.006 0.000 - 0.026 ng/mL           RADIOLOGY STUDIES:  Imaging Results    None           EKG Readings: (Independently Interpreted)   Initial Reading: No STEMI. Rhythm: Normal Sinus Rhythm. Heart Rate: 89.   Low voltage QRS  Non specific T wave abnormality            The above vital signs and test results have been reviewed by the emergency provider.     ED Medications:  Medications   sodium chloride 0.9% bolus 1,000 mL (0 mLs Intravenous Stopped 6/16/19 1626)   potassium chloride SA CR tablet 60 mEq (60 mEq Oral Given 6/16/19 1627)     Current Discharge Medication List        Discharge Medications:  New Prescriptions    POTASSIUM CHLORIDE SA (K-DUR,KLOR-CON) 20 MEQ TABLET    Take 1 tablet (20 mEq total) by mouth 2 (two) times daily. for 7 days      Follow-up Information     Primary care doctor In 3 days.           [facility].    Specialty:  Emergency Medicine  Why:  If symptoms worsen  Contact information:  [address] [address] [address] [phone]                4:32 PM: Reassessed pt at this time.  Pt states her condition has improved at this time. Discussed with pt all pertinent ED information and results. Discussed pt dx and plan of tx. Gave pt all f/u and return to the ED instructions. All questions and concerns were addressed at this time. Pt expresses understanding of  information and instructions, and is comfortable with plan to discharge. Pt is stable for discharge.     I discussed with patient and/or family/caretaker that evaluation in the ED does not suggest any emergent or life threatening medical conditions requiring immediate intervention beyond what was provided in the ED, and I believe patient is safe for discharge. Regardless, an unremarkable evaluation in the ED does not preclude the development or presence of a serious or life threatening condition. As such, patient was instructed to return immediately for any worsening or change in current symptoms.    MEDICAL DECISION MAKING                 CLINICAL IMPRESSION       ICD-10-CM ICD-9-CM   1. Diarrhea, unspecified type R19.7 787.91   2. Fatigue R53.83 780.79   3. Hypokalemia E87.6 276.8               Chris Robbins Jr., [facility]  06/16/19 5492

## 2019-06-17 LAB
HCV AB SERPL QL IA: NEGATIVE
HIV 1+2 AB+HIV1 P24 AG SERPL QL IA: NEGATIVE

## 2019-10-18 ENCOUNTER — HOSPITAL ENCOUNTER (EMERGENCY)
Facility: HOSPITAL | Age: 59
Discharge: HOME | End: 2019-10-18
Attending: EMERGENCY MEDICINE
Payer: MEDICAID

## 2019-10-18 VITALS
BODY MASS INDEX: 38.98 KG/M2 | HEART RATE: 101 BPM | OXYGEN SATURATION: 100 % | WEIGHT: 220 LBS | RESPIRATION RATE: 19 BRPM | SYSTOLIC BLOOD PRESSURE: 137 MMHG | DIASTOLIC BLOOD PRESSURE: 64 MMHG | HEIGHT: 63 IN | TEMPERATURE: 99 F

## 2019-10-18 DIAGNOSIS — M10.9 ACUTE GOUT INVOLVING TOE OF RIGHT FOOT, UNSPECIFIED CAUSE: Primary | ICD-10-CM

## 2019-10-18 DIAGNOSIS — M79.674 PAIN OF GREAT TOE, RIGHT: ICD-10-CM

## 2019-10-18 PROCEDURE — 25000003 PHARM REV CODE 250: Mod: ER | Performed by: EMERGENCY MEDICINE

## 2019-10-18 PROCEDURE — 99284 EMERGENCY DEPT VISIT MOD MDM: CPT | Mod: 25,ER

## 2019-10-18 RX ORDER — METHYLPREDNISOLONE 4 MG/1
TABLET ORAL
Qty: 1 PACKAGE | Refills: 0 | Status: SHIPPED | OUTPATIENT
Start: 2019-10-18 | End: 2019-11-08

## 2019-10-18 RX ORDER — COLCHICINE 0.6 MG/1
TABLET ORAL
Qty: 10 TABLET | Refills: 0 | Status: SHIPPED | OUTPATIENT
Start: 2019-10-18 | End: 2020-01-02 | Stop reason: SDUPTHER

## 2019-10-18 RX ORDER — HYDROCODONE BITARTRATE AND ACETAMINOPHEN 7.5; 325 MG/1; MG/1
1 TABLET ORAL EVERY 6 HOURS PRN
Qty: 10 TABLET | Refills: 0 | Status: SHIPPED | OUTPATIENT
Start: 2019-10-18

## 2019-10-18 RX ORDER — HYDROCODONE BITARTRATE AND ACETAMINOPHEN 10; 325 MG/1; MG/1
1 TABLET ORAL
Status: COMPLETED | OUTPATIENT
Start: 2019-10-18 | End: 2019-10-18

## 2019-10-18 RX ADMIN — HYDROCODONE BITARTRATE AND ACETAMINOPHEN 1 TABLET: 10; 325 TABLET ORAL at 10:10

## 2019-10-19 NOTE — DISCHARGE INSTRUCTIONS
Pain Medication Interaction warning.    Do not take any sedative medications (benadryl, ativan, xanax, Klonopin, trazadone); medicine for sleep; other pain pills (lortab, percocet), alcohol, with your narcotic prescription.  This could lead to an accidental overdose and possible death.

## 2019-10-19 NOTE — ED TRIAGE NOTES
Patient presents to ED via POV with c/o right foot pain.  Patient states it started this morning with her right great toe and is now hurting into the foot.

## 2019-10-19 NOTE — ED PROVIDER NOTES
Patient complains of pain of right great toe x one day.  Denies injury/trauma.  Patient reports movement increased pain symptoms.  No history of gout. Xray of right foot ordered.    KAITLIN Amaral       History     Chief Complaint   Patient presents with    Foot Pain     woke up this am with pain in right great toe that has since travled to entire right foot.  No known injury.  did not take anything for pain       Review of patient's allergies indicates:   Allergen Reactions    Latex, natural rubber Rash    Doxycycline Hives       History of Present Illness   HPI    10/18/2019, 10:00 PM  The history is provided by the patient    Elaine Penaloza is a 58 y.o. female presenting to the ED for right toe pain.  Patient reports that she woke up this morning with pain to the right toe.  She states that if she feels like she can crack it it will feel better.  Patient has never had pain like this.  It is rated as 10/10.  Patient denies any trauma, fever, chills, injury.  She notes that there is redness to the to the metatarsophalangeal joint.  Patient has never had pain like this before.  Pain is aggravated when she walks on it.  Better with rest.  Although it is sensitive to even light touch.      Arrival mode:  Personal Vehicle    PCP: [facility]     Allergies:  Review of patient's allergies indicates:   Allergen Reactions    Latex, natural rubber Rash    Doxycycline Hives       Past Medical History:  Past Medical History:   Diagnosis Date    Asthma     Dyshidrotic eczema 7/29/2015    GERD (gastroesophageal reflux disease)     Hyperlipidemia     Hypertension     Lichen nitidus 10/23/2018    Obesity     Osteoarthritis 2/11/2016    Type 2 diabetes mellitus, without long-term current use of insulin 5/3/2018       Past Surgical History:  History reviewed. No pertinent surgical history.      Family History:  History reviewed. No pertinent family history.    Social History:  Social History      Tobacco Use    Smoking status: Never Smoker    Smokeless tobacco: Never Used   Substance and Sexual Activity    Alcohol use: No    Drug use: No    Sexual activity: Not on file        Review of Systems   Review of Systems   Constitutional: Negative for fever.   HENT: Negative for sore throat.    Respiratory: Negative for shortness of breath.    Cardiovascular: Negative for chest pain.   Gastrointestinal: Negative for nausea.   Genitourinary: Negative for dysuria.   Musculoskeletal: Positive for arthralgias. Negative for back pain.   Skin: Positive for rash.   Neurological: Negative for weakness.   Hematological: Does not bruise/bleed easily.      Physical Exam     Initial Vitals [10/18/19 2127]   BP Pulse Resp Temp SpO2   137/64 101 19 98.5 °F (36.9 °C) 100 %      MAP       --          Physical Exam    Nursing Notes and Vital Signs Reviewed.  Constitutional: Patient is in no apparent distress. Well-developed and well-nourished.  Head: Atraumatic. Normocephalic.  Eyes: PERRL. EOM intact. Conjunctivae are not pale. No scleral icterus.  ENT: Mucous membranes are moist. Oropharynx is clear and symmetric.    Cardiovascular: Regular rate. Regular rhythm. No murmurs, rubs, or gallops. Distal pulses are 2+ and symmetric.  Pulmonary/Chest: No respiratory distress. Clear to auscultation bilaterally. No wheezing or rales.  Musculoskeletal: Moves all extremities. No obvious deformities. No edema. No calf tenderness.  Right foot:  There is swelling that is located to the right metatarsophalangeal joint with minimal redness which is present.  There is pain that is elicited with light touch of the joint.  Cap refill is less than 3 sec.  No crepitance is appreciated. No blister formation is appreciated. Dorsalis pedis and tibialis pulses are intact.  Skin: Warm and dry.  Neurological:  Alert, awake, and appropriate.  Normal speech.  No acute focal neurological deficits are appreciated.  Psychiatric: Normal affect. Good eye  "contact. Appropriate in content.     ED Course     Procedures    ED Vital Signs:  Vitals:    10/18/19 2127   BP: 137/64   Pulse: 101   Resp: 19   Temp: 98.5 °F (36.9 °C)   TempSrc: Oral   SpO2: 100%   Weight: 99.8 kg (220 lb)   Height: 5' 3" (1.6 m)       Abnormal Lab Results:  Labs Reviewed - No data to display     All Lab Results:  none      Imaging Results:  Imaging Results          X-Ray Foot Complete Right (Final result)  Result time 10/18/19 22:19:51    Final result by Jameel Carr MD (10/18/19 22:19:51)                 Impression:      No acute abnormality identified.      Electronically signed by: Jameel Carr  Date:    10/18/2019  Time:    22:19             Narrative:    EXAMINATION:  XR FOOT COMPLETE 3 VIEW RIGHT    CLINICAL HISTORY:  .  Pain in right toe(s)    TECHNIQUE:  AP, lateral and oblique views of the left foot were performed.    COMPARISON:  None    FINDINGS:  No evidence of fracture or dislocation.  Soft tissues unremarkable.  No radiopaque foreign body.  Joint spaces appear well maintained. Achilles insertional and plantar calcaneal spurring/enthesopathy.                                 The Emergency Provider reviewed the vital signs and test results, which are outlined above.     ED Discussion        10:49 PM  Reassessment: Dr. Lara reassessed the pt.  The pt is resting comfortably and is NAD.  Pt states their sx have improved. Discussed test results, shared treatment plan, specific conditions for return, and the need for f/u.  Answered their questions at this time.  Pt understands and agrees to the plan.  The pt has remained hemodynamically stable through ED course and is stable for discharge.    10:49 PM   reviewed. I discussed with the patient/guardian regarding the the quantity of the opioid.  I discussed the patient/guardian's option to fill the prescription in a lesser quantity.   I also discussed with the patient/guardian the risks associated with the opioid.    I discussed " with patient and/or family/caretaker that evaluation in the ED does not suggest any emergent or life threatening medical conditions requiring immediate intervention beyond what was provided in the ED, and I believe patient is safe for discharge.  Regardless, an unremarkable evaluation in the ED does not preclude the development or presence of a serious of life threatening condition. As such, patient was instructed to return immediately for any worsening or change in current symptoms.      ED Medication(s):  Medications   HYDROcodone-acetaminophen  mg per tablet 1 tablet (1 tablet Oral Given 10/18/19 2238)          Medication List      START taking these medications    colchicine 0.6 mg tablet  Commonly known as:  COLCRYS  Day one:  Take one tablet by mouth three times a day for one day, then one tablet daily there after.     HYDROcodone-acetaminophen 7.5-325 mg per tablet  Commonly known as:  NORCO  Take 1 tablet by mouth every 6 (six) hours as needed for Pain.     methylPREDNISolone 4 mg tablet  Commonly known as:  MEDROL DOSEPACK  As directed.        ASK your doctor about these medications    albuterol 90 mcg/actuation inhaler  Commonly known as:  PROVENTIL/VENTOLIN HFA     amLODIPine 10 MG tablet  Commonly known as:  NORVASC     atorvastatin 20 MG tablet  Commonly known as:  LIPITOR     chlorthalidone 25 MG Tab  Commonly known as:  HYGROTEN     fluticasone propion-salmeterol 113-14 mcg/actuation Aepb     fluticasone propionate 50 mcg/actuation nasal spray  Commonly known as:  FLONASE  2 sprays (100 mcg total) by Each Nare route once daily.     linaGLIPtin 5 mg Tab tablet  Commonly known as:  TRADJENTA     loratadine 10 mg tablet  Commonly known as:  CLARITIN     losartan 100 MG tablet  Commonly known as:  COZAAR     montelukast 10 mg tablet  Commonly known as:  SINGULAIR     pantoprazole 40 MG tablet  Commonly known as:  PROTONIX     promethazine 25 MG tablet  Commonly known as:  PHENERGAN  Take 1 tablet  "(25 mg total) by mouth every 6 (six) hours as needed for Nausea.     tazarotene 0.05 % Crea cream  Commonly known as:  TAZORAC           Where to Get Your Medications      You can get these medications from any pharmacy    Bring a paper prescription for each of these medications  · colchicine 0.6 mg tablet  · HYDROcodone-acetaminophen 7.5-325 mg per tablet  · methylPREDNISolone 4 mg tablet                  MIPS Measures     Smoker? No     Hypertension: History of Hypertension: The patient has elevated blood pressure (higher than 120/80) while being treated in the ED but has a history of hypertension.       Medical Decision Making                 MDM  Reviewed: vitals and nursing note  Interpretation: x-ray        Portions of this note may have been created with voice recognition software. Occasional "wrong-word" or "sound-a-like" substitutions may have occurred due to the inherent limitations of voice recognition software. Please, read the note carefully and recognize, using context, where substitutions have occurred.        Clinical Impression       ICD-10-CM ICD-9-CM   1. Acute gout involving toe of right foot, unspecified cause M10.9 274.01   2. Pain of great toe, right M79.674 729.5            Disposition: Discharge to home  Patient condition: Stable           Christiane Lara, DO  10/18/19 2251    "

## 2020-01-02 ENCOUNTER — HOSPITAL ENCOUNTER (EMERGENCY)
Facility: HOSPITAL | Age: 60
Discharge: HOME | End: 2020-01-02
Attending: EMERGENCY MEDICINE
Payer: MEDICAID

## 2020-01-02 VITALS
BODY MASS INDEX: 39.44 KG/M2 | OXYGEN SATURATION: 100 % | SYSTOLIC BLOOD PRESSURE: 128 MMHG | HEART RATE: 86 BPM | WEIGHT: 222.69 LBS | RESPIRATION RATE: 16 BRPM | TEMPERATURE: 99 F | DIASTOLIC BLOOD PRESSURE: 61 MMHG

## 2020-01-02 DIAGNOSIS — M79.671 RIGHT FOOT PAIN: ICD-10-CM

## 2020-01-02 DIAGNOSIS — R03.0 ELEVATED BLOOD PRESSURE READING: ICD-10-CM

## 2020-01-02 DIAGNOSIS — M10.9 ACUTE GOUT OF RIGHT FOOT, UNSPECIFIED CAUSE: Primary | ICD-10-CM

## 2020-01-02 PROCEDURE — 99283 EMERGENCY DEPT VISIT LOW MDM: CPT | Mod: 25,ER

## 2020-01-02 PROCEDURE — 25000003 PHARM REV CODE 250: Mod: ER | Performed by: PHYSICIAN ASSISTANT

## 2020-01-02 RX ORDER — COLCHICINE 0.6 MG/1
TABLET ORAL
Qty: 3 TABLET | Refills: 0 | Status: SHIPPED | OUTPATIENT
Start: 2020-01-02

## 2020-01-02 RX ORDER — NAPROXEN 500 MG/1
500 TABLET ORAL
Status: COMPLETED | OUTPATIENT
Start: 2020-01-02 | End: 2020-01-02

## 2020-01-02 RX ADMIN — NAPROXEN 500 MG: 500 TABLET ORAL at 02:01

## 2020-01-02 NOTE — ED PROVIDER NOTES
History      Chief Complaint   Patient presents with    Foot Injury     c/o right atraumatic foot pain onset yesterday. pt states she thinks it is a gout flair-up       Review of patient's allergies indicates:   Allergen Reactions    Latex, natural rubber Rash    Doxycycline Hives        HPI   HPI    1/2/2020, 1:29 PM   History obtained from the patient      History of Present Illness: Elaine Penaloza is a 59 y.o. female patient who presents to the Emergency Department for right foot pain x 2 days.  Patient states that she thinks she has a gout flare up.  Denies injury/trauma to foot.  Patient admits to eating beans and fish recently.  No treatments tried.  Denies fever, vomiting, diarrhea, chest pain, SOB, headache, dizziness.       Arrival mode: Personal vehicle      [facility]P: [facility]       Past Medical History:  Past Medical History:   Diagnosis Date    Asthma     Dyshidrotic eczema 7/29/2015    GERD (gastroesophageal reflux disease)     Hyperlipidemia     Hypertension     Lichen nitidus 10/23/2018    Obesity     Osteoarthritis 2/11/2016    Type 2 diabetes mellitus, without long-term current use of insulin 5/3/2018       Past Surgical History:  Past Surgical History:   Procedure Laterality Date    BREAST SURGERY      CYST REMOVAL           Family History:  History reviewed. No pertinent family history.    Social History:  Social History     Tobacco Use    Smoking status: Never Smoker    Smokeless tobacco: Never Used   Substance and Sexual Activity    Alcohol use: No    Drug use: No    Sexual activity: Not on file       ROS   Review of Systems   Constitutional: Negative for chills and fever.   HENT: Negative for congestion and rhinorrhea.    Eyes: Negative for discharge and redness.   Respiratory: Negative for cough and wheezing.    Cardiovascular: Negative for chest pain and palpitations.   Gastrointestinal: Negative for diarrhea, nausea and vomiting.   Genitourinary:  Negative for dysuria and frequency.   Musculoskeletal: Positive for arthralgias and myalgias.   Skin: Negative for rash and wound.   Neurological: Negative for dizziness and headaches.       Physical Exam      Initial Vitals [01/02/20 1313]   BP Pulse Resp Temp SpO2   128/61 92 19 98.9 °F (37.2 °C) 100 %      MAP       --          Physical Exam  Nursing Notes and Vital Signs Reviewed.  Constitutional: Patient is in no apparent distress. Awake and alert. Well-developed and well-nourished.  Head: Atraumatic. Normocephalic.  Eyes: PERRL. EOM intact. Conjunctivae are not pale. No scleral icterus.  ENT: Mucous membranes are moist. Oropharynx is clear and symmetric.    Neck: Supple. Full ROM. No lymphadenopathy.  Cardiovascular: Regular rate. Regular rhythm. No murmurs, rubs, or gallops. Distal pulses are 2+ and symmetric.  Pulmonary/Chest: No respiratory distress. Clear to auscultation bilaterally. No wheezing, rales, or rhonchi.  Abdominal: Soft and non-distended.  There is no tenderness.  No rebound, guarding, or rigidity. Good bowel sounds.  Genitourinary: No CVA tenderness  Musculoskeletal: Moves all extremities. No obvious deformities. No edema. No calf tenderness.  RLE:  TTP over dorsal side of lateral foot.  Mild erythema to dorsal side of foot. Pain reproduced with flexion and extension of ankle.  Dorsalis pedis pulse 2+.  Brisk capillary refill.     Skin: Warm and dry.  Neurological:  Alert, awake, and appropriate.  Normal speech.  No acute focal neurological deficits are appreciated.  Psychiatric: Normal affect. Good eye contact. Appropriate in content.    ED Course    Procedures  ED Vital Signs:  Vitals:    01/02/20 1313 01/02/20 1433   BP: 128/61    Pulse: 92 86   Resp: 19 16   Temp: 98.9 °F (37.2 °C)    TempSrc: Oral    SpO2: 100%    Weight: 101 kg (222 lb 10.6 oz)        Abnormal Lab Results:  Labs Reviewed - No data to display     All Lab Results:  None    Imaging Results:  Imaging Results          X-Ray  Foot Complete Right (Final result)  Result time 01/02/20 13:52:38    Final result by STUART Lucas Sr., MD (01/02/20 13:52:38)                 Impression:      There is a small spur at the site of attachment of the plantar fascia to the calcaneus. There is a small spur near the site of attachment of the Achilles tendon to the calcaneus.      Electronically signed by: Andrei Lucas MD  Date:    01/02/2020  Time:    13:52             Narrative:    EXAMINATION:  XR FOOT COMPLETE 3 VIEW RIGHT    CLINICAL HISTORY:  Pain in right foot    COMPARISON:  10/18/2019    FINDINGS:  There is no fracture. There is no dislocation.  There is a small spur at the site of attachment of the plantar fascia to the calcaneus.  There is a small spur near the site of attachment of the Achilles tendon to the calcaneus.                                        The Emergency Provider reviewed the vital signs and test results, which are outlined above.    ED Discussion     2:30 PM:  Discussed with pt all pertinent ED information and results. Discussed pt dx and plan of tx. Gave pt all f/u and return to the ED instructions. All questions and concerns were addressed at this time. Pt expresses understanding of information and instructions, and is comfortable with plan to discharge. Pt is stable for discharge.    I discussed with patient and/or family/caretaker that evaluation in the ED does not suggest any emergent or life threatening medical conditions requiring immediate intervention beyond what was provided in the ED, and I believe patient is safe for discharge.  Regardless, an unremarkable evaluation in the ED does not preclude the development or presence of a serious of life threatening condition. As such, patient was instructed to return immediately for any worsening or change in current symptoms.    Pre-hypertension/Hypertension: The pt has been informed that they may have pre-hypertension or hypertension based on a blood pressure reading  in the ED. I recommend that the pt call the PCP listed on their discharge instructions or a physician of their choice this week to arrange f/u for further evaluation of possible pre-hypertension or hypertension.       ED Medication(s):  Medications   naproxen tablet 500 mg (500 mg Oral Given 1/2/20 1431)       Discharge Medication List as of 1/2/2020  2:24 PM          Follow-up Information     [facility]. Schedule an appointment as soon as possible for a visit in 3 days.    Contact information:  Address: [address] [place] [id number].  Phone:  [phone]                   Medical Decision Making                  Clinical Impression       ICD-10-CM ICD-9-CM   1. Acute gout of right foot, unspecified cause M10.9 274.01   2. Right foot pain M79.671 729.5   3. Elevated blood pressure reading R03.0 796.2       Disposition:   Disposition: Discharged  Condition: Stable           Gilda Caballero PA-C  01/02/20 3861

## 2020-09-19 ENCOUNTER — HOSPITAL ENCOUNTER (EMERGENCY)
Facility: HOSPITAL | Age: 60
Discharge: HOME | End: 2020-09-19
Attending: EMERGENCY MEDICINE
Payer: MEDICAID

## 2020-09-19 VITALS
RESPIRATION RATE: 14 BRPM | TEMPERATURE: 98 F | SYSTOLIC BLOOD PRESSURE: 202 MMHG | HEART RATE: 82 BPM | BODY MASS INDEX: 39.41 KG/M2 | WEIGHT: 222.44 LBS | OXYGEN SATURATION: 100 % | DIASTOLIC BLOOD PRESSURE: 100 MMHG | HEIGHT: 63 IN

## 2020-09-19 DIAGNOSIS — I10 HYPERTENSION: ICD-10-CM

## 2020-09-19 DIAGNOSIS — R42 DIZZINESS: Primary | ICD-10-CM

## 2020-09-19 LAB
ALBUMIN SERPL BCP-MCNC: 3.7 G/DL (ref 3.5–5.2)
ALP SERPL-CCNC: 140 U/L (ref 55–135)
ALT SERPL W/O P-5'-P-CCNC: 20 U/L (ref 10–44)
AMORPH CRY UR QL COMP ASSIST: NORMAL
ANION GAP SERPL CALC-SCNC: 9 MMOL/L (ref 8–16)
AST SERPL-CCNC: 15 U/L (ref 10–40)
BACTERIA #/AREA URNS AUTO: NORMAL /HPF
BASOPHILS # BLD AUTO: 0.01 K/UL (ref 0–0.2)
BASOPHILS NFR BLD: 0.1 % (ref 0–1.9)
BILIRUB SERPL-MCNC: 0.6 MG/DL (ref 0.1–1)
BILIRUB UR QL STRIP: NEGATIVE
BNP SERPL-MCNC: 20 PG/ML (ref 0–99)
BUN SERPL-MCNC: 12 MG/DL (ref 6–20)
CALCIUM SERPL-MCNC: 9.4 MG/DL (ref 8.7–10.5)
CHLORIDE SERPL-SCNC: 107 MMOL/L (ref 95–110)
CLARITY UR REFRACT.AUTO: CLEAR
CO2 SERPL-SCNC: 27 MMOL/L (ref 23–29)
COLOR UR AUTO: YELLOW
CREAT SERPL-MCNC: 0.8 MG/DL (ref 0.5–1.4)
DIFFERENTIAL METHOD: NORMAL
EOSINOPHIL # BLD AUTO: 0.4 K/UL (ref 0–0.5)
EOSINOPHIL NFR BLD: 4.1 % (ref 0–8)
ERYTHROCYTE [DISTWIDTH] IN BLOOD BY AUTOMATED COUNT: 11.7 % (ref 11.5–14.5)
EST. GFR  (AFRICAN AMERICAN): >60 ML/MIN/1.73 M^2
EST. GFR  (NON AFRICAN AMERICAN): >60 ML/MIN/1.73 M^2
GLUCOSE SERPL-MCNC: 202 MG/DL (ref 70–110)
GLUCOSE UR QL STRIP: NEGATIVE
HCT VFR BLD AUTO: 41.6 % (ref 37–48.5)
HCV AB SERPL QL IA: NEGATIVE
HGB BLD-MCNC: 13.6 G/DL (ref 12–16)
HGB UR QL STRIP: NEGATIVE
HIV 1+2 AB+HIV1 P24 AG SERPL QL IA: NEGATIVE
IMM GRANULOCYTES # BLD AUTO: 0.02 K/UL (ref 0–0.04)
IMM GRANULOCYTES NFR BLD AUTO: 0.2 % (ref 0–0.5)
KETONES UR QL STRIP: NEGATIVE
LEUKOCYTE ESTERASE UR QL STRIP: ABNORMAL
LYMPHOCYTES # BLD AUTO: 2.7 K/UL (ref 1–4.8)
LYMPHOCYTES NFR BLD: 28.8 % (ref 18–48)
MCH RBC QN AUTO: 29.8 PG (ref 27–31)
MCHC RBC AUTO-ENTMCNC: 32.7 G/DL (ref 32–36)
MCV RBC AUTO: 91 FL (ref 82–98)
MICROSCOPIC COMMENT: NORMAL
MONOCYTES # BLD AUTO: 0.6 K/UL (ref 0.3–1)
MONOCYTES NFR BLD: 6.7 % (ref 4–15)
NEUTROPHILS # BLD AUTO: 5.7 K/UL (ref 1.8–7.7)
NEUTROPHILS NFR BLD: 60.1 % (ref 38–73)
NITRITE UR QL STRIP: NEGATIVE
NRBC BLD-RTO: 0 /100 WBC
PH UR STRIP: 7 [PH] (ref 5–8)
PLATELET # BLD AUTO: 233 K/UL (ref 150–350)
PMV BLD AUTO: 10.8 FL (ref 9.2–12.9)
POTASSIUM SERPL-SCNC: 3.9 MMOL/L (ref 3.5–5.1)
PROT SERPL-MCNC: 7.3 G/DL (ref 6–8.4)
PROT UR QL STRIP: NEGATIVE
RBC # BLD AUTO: 4.57 M/UL (ref 4–5.4)
SODIUM SERPL-SCNC: 143 MMOL/L (ref 136–145)
SP GR UR STRIP: 1.01 (ref 1–1.03)
TROPONIN I SERPL DL<=0.01 NG/ML-MCNC: 0.03 NG/ML (ref 0–0.03)
URN SPEC COLLECT METH UR: ABNORMAL
UROBILINOGEN UR STRIP-ACNC: NEGATIVE EU/DL
WBC # BLD AUTO: 9.44 K/UL (ref 3.9–12.7)
WBC #/AREA URNS AUTO: 1 /HPF (ref 0–5)

## 2020-09-19 PROCEDURE — 84484 ASSAY OF TROPONIN QUANT: CPT | Mod: ER

## 2020-09-19 PROCEDURE — 93010 EKG 12-LEAD: ICD-10-PCS | Mod: ,,, | Performed by: INTERNAL MEDICINE

## 2020-09-19 PROCEDURE — 25000003 PHARM REV CODE 250: Mod: ER | Performed by: EMERGENCY MEDICINE

## 2020-09-19 PROCEDURE — 99285 EMERGENCY DEPT VISIT HI MDM: CPT | Mod: 25,ER

## 2020-09-19 PROCEDURE — 96374 THER/PROPH/DIAG INJ IV PUSH: CPT | Mod: ER

## 2020-09-19 PROCEDURE — 81000 URINALYSIS NONAUTO W/SCOPE: CPT | Mod: ER

## 2020-09-19 PROCEDURE — 83880 ASSAY OF NATRIURETIC PEPTIDE: CPT | Mod: ER

## 2020-09-19 PROCEDURE — 86803 HEPATITIS C AB TEST: CPT

## 2020-09-19 PROCEDURE — 63600175 PHARM REV CODE 636 W HCPCS: Mod: ER | Performed by: EMERGENCY MEDICINE

## 2020-09-19 PROCEDURE — 85025 COMPLETE CBC W/AUTO DIFF WBC: CPT | Mod: ER

## 2020-09-19 PROCEDURE — 86703 HIV-1/HIV-2 1 RESULT ANTBDY: CPT

## 2020-09-19 PROCEDURE — 93010 ELECTROCARDIOGRAM REPORT: CPT | Mod: ,,, | Performed by: INTERNAL MEDICINE

## 2020-09-19 PROCEDURE — 93005 ELECTROCARDIOGRAM TRACING: CPT | Mod: ER

## 2020-09-19 PROCEDURE — 80053 COMPREHEN METABOLIC PANEL: CPT | Mod: ER

## 2020-09-19 RX ORDER — HYDRALAZINE HYDROCHLORIDE 20 MG/ML
10 INJECTION INTRAMUSCULAR; INTRAVENOUS
Status: COMPLETED | OUTPATIENT
Start: 2020-09-19 | End: 2020-09-19

## 2020-09-19 RX ORDER — CLONIDINE HYDROCHLORIDE 0.1 MG/1
0.1 TABLET ORAL
Status: COMPLETED | OUTPATIENT
Start: 2020-09-19 | End: 2020-09-19

## 2020-09-19 RX ADMIN — CLONIDINE HYDROCHLORIDE 0.1 MG: 0.1 TABLET ORAL at 01:09

## 2020-09-19 RX ADMIN — HYDRALAZINE HYDROCHLORIDE 10 MG: 20 INJECTION INTRAMUSCULAR; INTRAVENOUS at 02:09

## 2020-09-19 NOTE — ED PROVIDER NOTES
"Encounter Date: 9/19/2020       History     Chief Complaint   Patient presents with    Dizziness     Meds taken at 9am, symtoms of "woosy" started at 1.5 hours later.     The history is provided by the patient.   Dizziness  This is a new problem. The current episode started 1 to 2 hours ago. The problem occurs constantly. The problem has not changed since onset.Pertinent negatives include no chest pain, no abdominal pain, no headaches and no shortness of breath. Nothing aggravates the symptoms. Nothing relieves the symptoms. She has tried nothing for the symptoms. The treatment provided no relief.   Pt started taking blood pressure medications today. Pt denies CP, focal weakness, SOB, HA.    Review of patient's allergies indicates:   Allergen Reactions    Latex, natural rubber Rash    Doxycycline Hives     Past Medical History:   Diagnosis Date    Asthma     Dyshidrotic eczema 7/29/2015    GERD (gastroesophageal reflux disease)     Hyperlipidemia     Hypertension     Lichen nitidus 10/23/2018    Obesity     Osteoarthritis 2/11/2016    Type 2 diabetes mellitus, without long-term current use of insulin 5/3/2018     Past Surgical History:   Procedure Laterality Date    BREAST SURGERY      CYST REMOVAL       No family history on file.  Social History     Tobacco Use    Smoking status: Never Smoker    Smokeless tobacco: Never Used   Substance Use Topics    Alcohol use: No    Drug use: No     Review of Systems   Constitutional: Negative for chills and fever.   Respiratory: Negative for shortness of breath.    Cardiovascular: Negative for chest pain.   Gastrointestinal: Negative for abdominal pain.   Neurological: Positive for dizziness. Negative for weakness and headaches.   All other systems reviewed and are negative.      Physical Exam     Initial Vitals [09/19/20 1129]   BP Pulse Resp Temp SpO2   (!) 211/95 82 17 98.1 °F (36.7 °C) 98 %      MAP       --         Physical Exam    Nursing note and vitals " reviewed.  Constitutional: She appears well-developed and well-nourished. No distress.   HENT:   Head: Normocephalic and atraumatic.   Mouth/Throat: Oropharynx is clear and moist.   Eyes: Conjunctivae and EOM are normal. Pupils are equal, round, and reactive to light.   Neck: Normal range of motion. Neck supple.   Cardiovascular: Normal rate, regular rhythm and normal heart sounds.   Pulmonary/Chest: Breath sounds normal. No respiratory distress.   Abdominal: Soft. Bowel sounds are normal. She exhibits no distension. There is no abdominal tenderness.   Musculoskeletal: Normal range of motion.   Neurological: She is alert and oriented to person, place, and time. She has normal strength.   Skin: Skin is warm and dry.   Psychiatric: She has a normal mood and affect. Thought content normal.         ED Course   Critical Care    Date/Time: 9/19/2020 2:08 PM  Performed by: Romulo Arrington MD  Authorized by: Romulo Arrington MD   Total critical care time (exclusive of procedural time) : 40 minutes  Critical care time was exclusive of separately billable procedures and treating other patients.  Critical care was necessary to treat or prevent imminent or life-threatening deterioration of the following conditions: CNS failure or compromise and cardiac failure.        Labs Reviewed   COMPREHENSIVE METABOLIC PANEL - Abnormal; Notable for the following components:       Result Value    Glucose 202 (*)     Alkaline Phosphatase 140 (*)     All other components within normal limits   URINALYSIS, REFLEX TO URINE CULTURE - Abnormal; Notable for the following components:    Leukocytes, UA 1+ (*)     All other components within normal limits    Narrative:     Specimen Source->Urine   CBC W/ AUTO DIFFERENTIAL   B-TYPE NATRIURETIC PEPTIDE   TROPONIN I   URINALYSIS MICROSCOPIC    Narrative:     Specimen Source->Urine   HIV 1 / 2 ANTIBODY   HEPATITIS C ANTIBODY     Results for orders placed or performed during the hospital  encounter of 09/19/20   CBC auto differential   Result Value Ref Range    WBC 9.44 3.90 - 12.70 K/uL    RBC 4.57 4.00 - 5.40 M/uL    Hemoglobin 13.6 12.0 - 16.0 g/dL    Hematocrit 41.6 37.0 - 48.5 %    Mean Corpuscular Volume 91 82 - 98 fL    Mean Corpuscular Hemoglobin 29.8 27.0 - 31.0 pg    Mean Corpuscular Hemoglobin Conc 32.7 32.0 - 36.0 g/dL    RDW 11.7 11.5 - 14.5 %    Platelets 233 150 - 350 K/uL    MPV 10.8 9.2 - 12.9 fL    Immature Granulocytes 0.2 0.0 - 0.5 %    Gran # (ANC) 5.7 1.8 - 7.7 K/uL    Immature Grans (Abs) 0.02 0.00 - 0.04 K/uL    Lymph # 2.7 1.0 - 4.8 K/uL    Mono # 0.6 0.3 - 1.0 K/uL    Eos # 0.4 0.0 - 0.5 K/uL    Baso # 0.01 0.00 - 0.20 K/uL    nRBC 0 0 /100 WBC    Gran% 60.1 38.0 - 73.0 %    Lymph% 28.8 18.0 - 48.0 %    Mono% 6.7 4.0 - 15.0 %    Eosinophil% 4.1 0.0 - 8.0 %    Basophil% 0.1 0.0 - 1.9 %    Differential Method Automated    Comprehensive metabolic panel   Result Value Ref Range    Sodium 143 136 - 145 mmol/L    Potassium 3.9 3.5 - 5.1 mmol/L    Chloride 107 95 - 110 mmol/L    CO2 27 23 - 29 mmol/L    Glucose 202 (H) 70 - 110 mg/dL    BUN, Bld 12 6 - 20 mg/dL    Creatinine 0.8 0.5 - 1.4 mg/dL    Calcium 9.4 8.7 - 10.5 mg/dL    Total Protein 7.3 6.0 - 8.4 g/dL    Albumin 3.7 3.5 - 5.2 g/dL    Total Bilirubin 0.6 0.1 - 1.0 mg/dL    Alkaline Phosphatase 140 (H) 55 - 135 U/L    AST 15 10 - 40 U/L    ALT 20 10 - 44 U/L    Anion Gap 9 8 - 16 mmol/L    eGFR if African American >60.0 >60 mL/min/1.73 m^2    eGFR if non African American >60.0 >60 mL/min/1.73 m^2   Brain Natriuretic Peptide   Result Value Ref Range    BNP 20 0 - 99 pg/mL   Troponin I   Result Value Ref Range    Troponin I 0.026 0.000 - 0.026 ng/mL   Urinalysis, Reflex to Urine Culture Urine, Clean Catch    Specimen: Urine   Result Value Ref Range    Specimen UA Urine, Clean Catch     Color, UA Yellow Yellow, Straw, Aruna    Appearance, UA Clear Clear    pH, UA 7.0 5.0 - 8.0    Specific Gravity, UA 1.015 1.005 - 1.030     Protein, UA Negative Negative    Glucose, UA Negative Negative    Ketones, UA Negative Negative    Bilirubin (UA) Negative Negative    Occult Blood UA Negative Negative    Nitrite, UA Negative Negative    Urobilinogen, UA Negative <2.0 EU/dL    Leukocytes, UA 1+ (A) Negative   Urinalysis Microscopic   Result Value Ref Range    WBC, UA 1 0 - 5 /hpf    Bacteria Rare None-Occ /hpf    Amorphous, UA Occasional None-Moderate    Microscopic Comment SEE COMMENT        EKG Readings: (Independently Interpreted)   Initial Reading: No STEMI. Rhythm: Normal Sinus Rhythm. Heart Rate: 84. ST Segments: Normal ST Segments. T Waves: Normal. Axis: Normal. Clinical Impression: Normal Sinus Rhythm       Imaging Results          X-Ray Chest 1 View (Final result)  Result time 09/19/20 12:25:16    Final result by Patricio Durham MD (09/19/20 12:25:16)                 Impression:      No acute findings.      Electronically signed by: Patricio Durham MD  Date:    09/19/2020  Time:    12:25             Narrative:    EXAMINATION:  XR CHEST 1 VIEW    CLINICAL HISTORY:  Shortness of breath., Dizziness;    COMPARISON:  01/29/2019    FINDINGS:  Heart size is normal. The lung fields are clear. No acute cardiopulmonary infiltrate.                               CT Head Without Contrast (Final result)  Result time 09/19/20 12:14:46    Final result by Anam Hu MD (09/19/20 12:14:46)                 Impression:      No CT evidence of acute intracranial abnormality.    All CT scans at this facility are performed  using dose modulation techniques as appropriate to performed exam including the following:  automated exposure control; adjustment of mA and/or kV according to the patients size (this includes techniques or standardized protocols for targeted exams where dose is matched to indication/reason for exam: i.e. extremities or head);  iterative reconstruction technique.      Electronically signed by: Anam Hu  MD  Date:    09/19/2020  Time:    12:14             Narrative:    EXAMINATION:  CT HEAD WITHOUT CONTRAST    CLINICAL HISTORY:  Essential (primary) hypertensionDizziness, non-specific;    TECHNIQUE:  Axial CT imaging was performed through the head without intravenous contrast.    COMPARISON:  None    FINDINGS:  No hydrocephalus, midline shift, mass effect, or acute intracranial hemorrhage. Cortical sulcal pattern and gray-white matter differentiation is normal. Basilar cisterns and posterior fossa are normal. The visualized paranasal sinuses and mastoid air cells are clear. The skull is intact.                            2:13 PM - Counseling: Spoke with the patient and discussed todays findings, in addition to providing specific details for the plan of care and counseling regarding the diagnosis and prognosis. Questions are answered at this time.  Neuro intact, No M/S loss, Non-focal.     Pre-hypertension/Hypertension: The pt has been informed that they may have pre-hypertension or hypertension based on a blood pressure reading in the ED. I recommend that the pt call the PCP listed on their discharge instructions or a physician of their choice this week to arrange f/u for further evaluation of possible pre-hypertension or hypertension.                                     Clinical Impression:       ICD-10-CM ICD-9-CM   1. Dizziness  R42 780.4   2. Hypertension  I10 401.9                      Disposition:   Disposition: Discharged  Condition: Stable                          Romulo Arrington MD  09/19/20 1325       Romulo Arrington MD  09/19/20 1411

## 2021-03-24 ENCOUNTER — HOSPITAL ENCOUNTER (EMERGENCY)
Facility: HOSPITAL | Age: 61
Discharge: HOME | End: 2021-03-24
Attending: EMERGENCY MEDICINE
Payer: MEDICAID

## 2021-03-24 VITALS
OXYGEN SATURATION: 100 % | RESPIRATION RATE: 19 BRPM | HEIGHT: 64 IN | BODY MASS INDEX: 37.75 KG/M2 | SYSTOLIC BLOOD PRESSURE: 172 MMHG | TEMPERATURE: 100 F | WEIGHT: 221.13 LBS | HEART RATE: 93 BPM | DIASTOLIC BLOOD PRESSURE: 74 MMHG

## 2021-03-24 DIAGNOSIS — L03.116 CELLULITIS OF LEFT LOWER EXTREMITY: Primary | ICD-10-CM

## 2021-03-24 DIAGNOSIS — I10 HYPERTENSION, UNSPECIFIED TYPE: ICD-10-CM

## 2021-03-24 PROCEDURE — 99283 EMERGENCY DEPT VISIT LOW MDM: CPT | Mod: ER

## 2021-03-24 PROCEDURE — 25000003 PHARM REV CODE 250: Mod: ER | Performed by: EMERGENCY MEDICINE

## 2021-03-24 RX ORDER — SULFAMETHOXAZOLE AND TRIMETHOPRIM 800; 160 MG/1; MG/1
1 TABLET ORAL
Status: COMPLETED | OUTPATIENT
Start: 2021-03-24 | End: 2021-03-24

## 2021-03-24 RX ORDER — SULFAMETHOXAZOLE AND TRIMETHOPRIM 800; 160 MG/1; MG/1
1 TABLET ORAL 2 TIMES DAILY
Qty: 14 TABLET | Refills: 0 | Status: SHIPPED | OUTPATIENT
Start: 2021-03-24 | End: 2021-03-31

## 2021-03-24 RX ORDER — AMOXICILLIN 500 MG/1
CAPSULE ORAL
COMMUNITY
Start: 2021-03-23

## 2021-03-24 RX ADMIN — SULFAMETHOXAZOLE AND TRIMETHOPRIM 1 TABLET: 800; 160 TABLET ORAL at 06:03

## 2021-03-25 ENCOUNTER — PES CALL (OUTPATIENT)
Dept: ADMINISTRATIVE | Facility: CLINIC | Age: 61
End: 2021-03-25

## 2021-07-08 ENCOUNTER — HOSPITAL ENCOUNTER (EMERGENCY)
Facility: HOSPITAL | Age: 61
Discharge: HOME | End: 2021-07-08
Attending: EMERGENCY MEDICINE
Payer: MEDICAID

## 2021-07-08 VITALS
SYSTOLIC BLOOD PRESSURE: 148 MMHG | WEIGHT: 220.88 LBS | RESPIRATION RATE: 18 BRPM | HEART RATE: 72 BPM | OXYGEN SATURATION: 100 % | TEMPERATURE: 98 F | BODY MASS INDEX: 37.92 KG/M2 | DIASTOLIC BLOOD PRESSURE: 67 MMHG

## 2021-07-08 DIAGNOSIS — J45.901 EXACERBATION OF ASTHMA, UNSPECIFIED ASTHMA SEVERITY, UNSPECIFIED WHETHER PERSISTENT: Primary | ICD-10-CM

## 2021-07-08 LAB
CTP QC/QA: YES
HCV AB SERPL QL IA: NEGATIVE
HEP C VIRUS HOLD SPECIMEN: NORMAL
HIV 1+2 AB+HIV1 P24 AG SERPL QL IA: NEGATIVE
SARS-COV-2 RDRP RESP QL NAA+PROBE: NEGATIVE

## 2021-07-08 PROCEDURE — 86803 HEPATITIS C AB TEST: CPT | Performed by: EMERGENCY MEDICINE

## 2021-07-08 PROCEDURE — 99284 EMERGENCY DEPT VISIT MOD MDM: CPT | Mod: ER

## 2021-07-08 PROCEDURE — 25000242 PHARM REV CODE 250 ALT 637 W/ HCPCS: Mod: ER | Performed by: EMERGENCY MEDICINE

## 2021-07-08 PROCEDURE — U0002 COVID-19 LAB TEST NON-CDC: HCPCS | Mod: ER | Performed by: EMERGENCY MEDICINE

## 2021-07-08 PROCEDURE — 87389 HIV-1 AG W/HIV-1&-2 AB AG IA: CPT | Performed by: EMERGENCY MEDICINE

## 2021-07-08 PROCEDURE — 94640 AIRWAY INHALATION TREATMENT: CPT | Mod: ER

## 2021-07-08 RX ORDER — IPRATROPIUM BROMIDE AND ALBUTEROL SULFATE 2.5; .5 MG/3ML; MG/3ML
3 SOLUTION RESPIRATORY (INHALATION)
Status: COMPLETED | OUTPATIENT
Start: 2021-07-08 | End: 2021-07-08

## 2021-07-08 RX ORDER — BENZONATATE 100 MG/1
100 CAPSULE ORAL 3 TIMES DAILY PRN
Qty: 15 CAPSULE | Refills: 0 | Status: SHIPPED | OUTPATIENT
Start: 2021-07-08

## 2021-07-08 RX ADMIN — IPRATROPIUM BROMIDE AND ALBUTEROL SULFATE 3 ML: .5; 3 SOLUTION RESPIRATORY (INHALATION) at 01:07

## 2022-03-24 NOTE — DISCHARGE INSTRUCTIONS
Alert-The patient is alert, awake and responds to voice. The patient is oriented to time, place, and person. The triage nurse is able to obtain subjective information. Your medications have been sent electronically to [facility] in [place].

## 2022-03-25 ENCOUNTER — HOSPITAL ENCOUNTER (EMERGENCY)
Facility: HOSPITAL | Age: 62
Discharge: HOME | End: 2022-03-25
Attending: EMERGENCY MEDICINE
Payer: MEDICAID

## 2022-03-25 VITALS
BODY MASS INDEX: 38.79 KG/M2 | HEART RATE: 87 BPM | TEMPERATURE: 99 F | RESPIRATION RATE: 16 BRPM | SYSTOLIC BLOOD PRESSURE: 150 MMHG | DIASTOLIC BLOOD PRESSURE: 68 MMHG | OXYGEN SATURATION: 100 % | WEIGHT: 226 LBS

## 2022-03-25 DIAGNOSIS — R11.0 NAUSEA: Primary | ICD-10-CM

## 2022-03-25 DIAGNOSIS — R07.9 CHEST PAIN: ICD-10-CM

## 2022-03-25 LAB
ALBUMIN SERPL BCP-MCNC: 4 G/DL (ref 3.5–5.2)
ALP SERPL-CCNC: 117 U/L (ref 55–135)
ALT SERPL W/O P-5'-P-CCNC: 13 U/L (ref 10–44)
ANION GAP SERPL CALC-SCNC: 9 MMOL/L (ref 8–16)
AST SERPL-CCNC: 13 U/L (ref 10–40)
BASOPHILS # BLD AUTO: 0.02 K/UL (ref 0–0.2)
BASOPHILS NFR BLD: 0.2 % (ref 0–1.9)
BILIRUB SERPL-MCNC: 0.5 MG/DL (ref 0.1–1)
BILIRUB UR QL STRIP: NEGATIVE
BUN SERPL-MCNC: 14 MG/DL (ref 6–20)
CALCIUM SERPL-MCNC: 9.8 MG/DL (ref 8.7–10.5)
CHLORIDE SERPL-SCNC: 108 MMOL/L (ref 95–110)
CLARITY UR REFRACT.AUTO: CLEAR
CO2 SERPL-SCNC: 26 MMOL/L (ref 23–29)
COLOR UR AUTO: YELLOW
CREAT SERPL-MCNC: 0.7 MG/DL (ref 0.5–1.4)
DIFFERENTIAL METHOD: NORMAL
EOSINOPHIL # BLD AUTO: 0.2 K/UL (ref 0–0.5)
EOSINOPHIL NFR BLD: 2.1 % (ref 0–8)
ERYTHROCYTE [DISTWIDTH] IN BLOOD BY AUTOMATED COUNT: 12.3 % (ref 11.5–14.5)
EST. GFR  (AFRICAN AMERICAN): >60 ML/MIN/1.73 M^2
EST. GFR  (NON AFRICAN AMERICAN): >60 ML/MIN/1.73 M^2
GLUCOSE SERPL-MCNC: 92 MG/DL (ref 70–110)
GLUCOSE UR QL STRIP: NEGATIVE
HCT VFR BLD AUTO: 42.3 % (ref 37–48.5)
HGB BLD-MCNC: 13.7 G/DL (ref 12–16)
HGB UR QL STRIP: NEGATIVE
IMM GRANULOCYTES # BLD AUTO: 0.03 K/UL (ref 0–0.04)
IMM GRANULOCYTES NFR BLD AUTO: 0.3 % (ref 0–0.5)
KETONES UR QL STRIP: NEGATIVE
LEUKOCYTE ESTERASE UR QL STRIP: NEGATIVE
LIPASE SERPL-CCNC: 26 U/L (ref 4–60)
LYMPHOCYTES # BLD AUTO: 3.2 K/UL (ref 1–4.8)
LYMPHOCYTES NFR BLD: 31.1 % (ref 18–48)
MCH RBC QN AUTO: 29.5 PG (ref 27–31)
MCHC RBC AUTO-ENTMCNC: 32.4 G/DL (ref 32–36)
MCV RBC AUTO: 91 FL (ref 82–98)
MONOCYTES # BLD AUTO: 0.6 K/UL (ref 0.3–1)
MONOCYTES NFR BLD: 6 % (ref 4–15)
NEUTROPHILS # BLD AUTO: 6.2 K/UL (ref 1.8–7.7)
NEUTROPHILS NFR BLD: 60.3 % (ref 38–73)
NITRITE UR QL STRIP: NEGATIVE
NRBC BLD-RTO: 0 /100 WBC
PH UR STRIP: 6 [PH] (ref 5–8)
PLATELET # BLD AUTO: 225 K/UL (ref 150–450)
PMV BLD AUTO: 10.2 FL (ref 9.2–12.9)
POTASSIUM SERPL-SCNC: 3.9 MMOL/L (ref 3.5–5.1)
PROT SERPL-MCNC: 7.7 G/DL (ref 6–8.4)
PROT UR QL STRIP: NEGATIVE
RBC # BLD AUTO: 4.65 M/UL (ref 4–5.4)
SODIUM SERPL-SCNC: 143 MMOL/L (ref 136–145)
SP GR UR STRIP: 1.02 (ref 1–1.03)
TROPONIN I SERPL DL<=0.01 NG/ML-MCNC: <0.006 NG/ML (ref 0–0.03)
URN SPEC COLLECT METH UR: NORMAL
UROBILINOGEN UR STRIP-ACNC: <2 EU/DL
WBC # BLD AUTO: 10.21 K/UL (ref 3.9–12.7)

## 2022-03-25 PROCEDURE — 83690 ASSAY OF LIPASE: CPT | Mod: ER | Performed by: EMERGENCY MEDICINE

## 2022-03-25 PROCEDURE — 96374 THER/PROPH/DIAG INJ IV PUSH: CPT | Mod: ER

## 2022-03-25 PROCEDURE — 80053 COMPREHEN METABOLIC PANEL: CPT | Mod: ER | Performed by: EMERGENCY MEDICINE

## 2022-03-25 PROCEDURE — 93010 EKG 12-LEAD: ICD-10-PCS | Mod: ,,, | Performed by: INTERNAL MEDICINE

## 2022-03-25 PROCEDURE — 96361 HYDRATE IV INFUSION ADD-ON: CPT | Mod: ER

## 2022-03-25 PROCEDURE — 99284 EMERGENCY DEPT VISIT MOD MDM: CPT | Mod: 25,ER

## 2022-03-25 PROCEDURE — 84484 ASSAY OF TROPONIN QUANT: CPT | Mod: ER | Performed by: EMERGENCY MEDICINE

## 2022-03-25 PROCEDURE — 93010 ELECTROCARDIOGRAM REPORT: CPT | Mod: ,,, | Performed by: INTERNAL MEDICINE

## 2022-03-25 PROCEDURE — 85025 COMPLETE CBC W/AUTO DIFF WBC: CPT | Mod: ER | Performed by: EMERGENCY MEDICINE

## 2022-03-25 PROCEDURE — 81003 URINALYSIS AUTO W/O SCOPE: CPT | Mod: ER | Performed by: EMERGENCY MEDICINE

## 2022-03-25 PROCEDURE — 93005 ELECTROCARDIOGRAM TRACING: CPT | Mod: ER

## 2022-03-25 PROCEDURE — 63600175 PHARM REV CODE 636 W HCPCS: Mod: ER | Performed by: EMERGENCY MEDICINE

## 2022-03-25 PROCEDURE — 25000003 PHARM REV CODE 250: Mod: ER | Performed by: EMERGENCY MEDICINE

## 2022-03-25 RX ORDER — ONDANSETRON 4 MG/1
4 TABLET, ORALLY DISINTEGRATING ORAL EVERY 8 HOURS PRN
Qty: 15 TABLET | Refills: 0 | Status: SHIPPED | OUTPATIENT
Start: 2022-03-25

## 2022-03-25 RX ORDER — ACETAMINOPHEN 500 MG
1000 TABLET ORAL
Status: COMPLETED | OUTPATIENT
Start: 2022-03-25 | End: 2022-03-25

## 2022-03-25 RX ORDER — ONDANSETRON 2 MG/ML
4 INJECTION INTRAMUSCULAR; INTRAVENOUS
Status: COMPLETED | OUTPATIENT
Start: 2022-03-25 | End: 2022-03-25

## 2022-03-25 RX ORDER — PANTOPRAZOLE SODIUM 40 MG/1
40 TABLET, DELAYED RELEASE ORAL DAILY
Status: DISCONTINUED | OUTPATIENT
Start: 2022-03-26 | End: 2022-03-25

## 2022-03-25 RX ORDER — PANTOPRAZOLE SODIUM 20 MG/1
40 TABLET, DELAYED RELEASE ORAL DAILY
Qty: 15 TABLET | Refills: 0 | Status: SHIPPED | OUTPATIENT
Start: 2022-03-25

## 2022-03-25 RX ORDER — PANTOPRAZOLE SODIUM 40 MG/1
40 TABLET, DELAYED RELEASE ORAL ONCE
Status: COMPLETED | OUTPATIENT
Start: 2022-03-25 | End: 2022-03-25

## 2022-03-25 RX ADMIN — ONDANSETRON 4 MG: 2 INJECTION INTRAMUSCULAR; INTRAVENOUS at 07:03

## 2022-03-25 RX ADMIN — ACETAMINOPHEN 1000 MG: 500 TABLET ORAL at 08:03

## 2022-03-25 RX ADMIN — PANTOPRAZOLE SODIUM 40 MG: 40 TABLET, DELAYED RELEASE ORAL at 08:03

## 2022-03-25 RX ADMIN — SODIUM CHLORIDE 1000 ML: 0.9 INJECTION, SOLUTION INTRAVENOUS at 07:03

## 2022-03-26 NOTE — ED PROVIDER NOTES
Encounter Date: 3/25/2022       History     Chief Complaint   Patient presents with    Nausea     Intermittent nausea x 2 days. No vomiting. Denies abdominal pain, diarrhea. Nausea feeling does not last long per pt.      Patient is a 61-year-old female who presents today with complaints of nausea x 2 days.  Symptoms are intermittent mild in severity.  No vomiting.  Denies any abdominal pain, fever fever, diarrhea, constipation, dysuria.  She does report some urinary frequency.  On review of systems, she did mention chest pain last night that is resolved today.  It resolved spontaneously.  Chest pain non exertional and mild.  No prior evaluation and no prior treatment.        Review of patient's allergies indicates:   Allergen Reactions    Latex, natural rubber Rash    Doxycycline Hives     Past Medical History:   Diagnosis Date    Asthma     Dyshidrotic eczema 7/29/2015    GERD (gastroesophageal reflux disease)     Hyperlipidemia     Hypertension     Lichen nitidus 10/23/2018    Obesity     Osteoarthritis 2/11/2016    Type 2 diabetes mellitus, without long-term current use of insulin 5/3/2018     Past Surgical History:   Procedure Laterality Date    BREAST SURGERY      CYST REMOVAL       No family history on file.  Social History     Tobacco Use    Smoking status: Never Smoker    Smokeless tobacco: Never Used   Substance Use Topics    Alcohol use: No    Drug use: No     Review of Systems   Constitutional: Negative for chills, diaphoresis and fever.   HENT: Negative for congestion, rhinorrhea and sore throat.    Eyes: Negative for pain, redness and visual disturbance.   Respiratory: Negative for cough and shortness of breath.    Cardiovascular: Positive for chest pain. Negative for palpitations and leg swelling.   Gastrointestinal: Positive for nausea. Negative for abdominal distention, abdominal pain, blood in stool, constipation, diarrhea and vomiting.   Genitourinary: Positive for frequency.  Negative for dysuria and hematuria.   Musculoskeletal: Negative for arthralgias and joint swelling.   Skin: Negative for rash and wound.   Neurological: Negative for seizures, syncope and headaches.   Hematological: Adenopathy: Last night.   All other systems reviewed and are negative.      Physical Exam     Initial Vitals [03/25/22 1751]   BP Pulse Resp Temp SpO2   (!) 173/77 91 16 99 °F (37.2 °C) 100 %      MAP       --         Physical Exam    Nursing note and vitals reviewed.  Constitutional: No distress.   Increased BMI   HENT:   Head: Normocephalic and atraumatic.   Mouth/Throat: Oropharynx is clear and moist.   Eyes: Conjunctivae and EOM are normal. Pupils are equal, round, and reactive to light.   Neck: Neck supple. No tracheal deviation present.   Cardiovascular: Normal rate, regular rhythm, normal heart sounds and intact distal pulses.   Pulmonary/Chest: Breath sounds normal. No respiratory distress.   Abdominal: Abdomen is soft. She exhibits no distension. There is no abdominal tenderness.   Negative Rios's.  Negative McBurney's.  No CVA tenderness There is no rebound and no guarding.   Musculoskeletal:         General: No tenderness or edema. Normal range of motion.      Cervical back: Neck supple.     Neurological: She is alert and oriented to person, place, and time.   No focal deficits   Skin: Skin is warm. No rash noted. No erythema.   Psychiatric: She has a normal mood and affect. Her behavior is normal.         ED Course   Procedures  Labs Reviewed   COMPREHENSIVE METABOLIC PANEL   CBC W/ AUTO DIFFERENTIAL   LIPASE   TROPONIN I   URINALYSIS, REFLEX TO URINE CULTURE    Narrative:     Specimen Source->Urine     Results for orders placed or performed during the hospital encounter of 03/25/22   Comprehensive metabolic panel   Result Value Ref Range    Sodium 143 136 - 145 mmol/L    Potassium 3.9 3.5 - 5.1 mmol/L    Chloride 108 95 - 110 mmol/L    CO2 26 23 - 29 mmol/L    Glucose 92 70 - 110 mg/dL     BUN 14 6 - 20 mg/dL    Creatinine 0.7 0.5 - 1.4 mg/dL    Calcium 9.8 8.7 - 10.5 mg/dL    Total Protein 7.7 6.0 - 8.4 g/dL    Albumin 4.0 3.5 - 5.2 g/dL    Total Bilirubin 0.5 0.1 - 1.0 mg/dL    Alkaline Phosphatase 117 55 - 135 U/L    AST 13 10 - 40 U/L    ALT 13 10 - 44 U/L    Anion Gap 9 8 - 16 mmol/L    eGFR if African American >60.0 >60 mL/min/1.73 m^2    eGFR if non African American >60.0 >60 mL/min/1.73 m^2   CBC auto differential   Result Value Ref Range    WBC 10.21 3.90 - 12.70 K/uL    RBC 4.65 4.00 - 5.40 M/uL    Hemoglobin 13.7 12.0 - 16.0 g/dL    Hematocrit 42.3 37.0 - 48.5 %    MCV 91 82 - 98 fL    MCH 29.5 27.0 - 31.0 pg    MCHC 32.4 32.0 - 36.0 g/dL    RDW 12.3 11.5 - 14.5 %    Platelets 225 150 - 450 K/uL    MPV 10.2 9.2 - 12.9 fL    Immature Granulocytes 0.3 0.0 - 0.5 %    Gran # (ANC) 6.2 1.8 - 7.7 K/uL    Immature Grans (Abs) 0.03 0.00 - 0.04 K/uL    Lymph # 3.2 1.0 - 4.8 K/uL    Mono # 0.6 0.3 - 1.0 K/uL    Eos # 0.2 0.0 - 0.5 K/uL    Baso # 0.02 0.00 - 0.20 K/uL    nRBC 0 0 /100 WBC    Gran % 60.3 38.0 - 73.0 %    Lymph % 31.1 18.0 - 48.0 %    Mono % 6.0 4.0 - 15.0 %    Eosinophil % 2.1 0.0 - 8.0 %    Basophil % 0.2 0.0 - 1.9 %    Differential Method Automated    Lipase   Result Value Ref Range    Lipase 26 4 - 60 U/L   Troponin I   Result Value Ref Range    Troponin I <0.006 0.000 - 0.026 ng/mL   Urinalysis, Reflex to Urine Culture Urine, Clean Catch    Specimen: Urine   Result Value Ref Range    Specimen UA Urine, Clean Catch     Color, UA Yellow Yellow, Straw, Aruna    Appearance, UA Clear Clear    pH, UA 6.0 5.0 - 8.0    Specific Gravity, UA 1.020 1.005 - 1.030    Protein, UA Negative Negative    Glucose, UA Negative Negative    Ketones, UA Negative Negative    Bilirubin (UA) Negative Negative    Occult Blood UA Negative Negative    Nitrite, UA Negative Negative    Urobilinogen, UA <2.0 <2.0 EU/dL    Leukocytes, UA Negative Negative     EKG reviewed interpreted by ED provider as normal sinus  rhythm with a rate of 72, normal axis, normal intervals, normal ST T segments including no ST depression or ST elevation     Medications   sodium chloride 0.9% bolus 1,000 mL (0 mLs Intravenous Stopped 3/25/22 2022)   ondansetron injection 4 mg (4 mg Intravenous Given 3/25/22 1912)   acetaminophen tablet 1,000 mg (1,000 mg Oral Given 3/25/22 2007)   pantoprazole EC tablet 40 mg (40 mg Oral Given 3/25/22 2006)     MDM:  Chief complaint of intermittent mild nausea for 2 days.  No abdominal pain.  Abdominal exam benign.  ED workup within normal limits.  Patient stable for discharge and outpatient follow-up.  ER return precautions provided    Clinical Impression:   Final diagnoses:  [R07.9] Chest pain  [R11.0] Nausea (Primary)          ED Disposition Condition    Discharge Stable        ED Prescriptions     Medication Sig Dispense Start Date End Date Auth. Provider    pantoprazole (PROTONIX) 20 MG tablet Take 2 tablets (40 mg total) by mouth once daily. 15 tablet 3/25/2022  Vamsi Mulligan MD    ondansetron (ZOFRAN-ODT) 4 MG TbDL Take 1 tablet (4 mg total) by mouth every 8 (eight) hours as needed (nausea). 15 tablet 3/25/2022  Vamsi Mulligan MD        Follow-up Information     Follow up With Specialties Details Why Contact Info    Follow-up with primary care physician        [place] - Emergency Dept Emergency Medicine  As needed, If symptoms worsen [id number]  [facility] [id number] [phone]           Vamsi Mulligan MD  03/25/22 6764

## 2022-10-05 ENCOUNTER — HOSPITAL ENCOUNTER (EMERGENCY)
Facility: HOSPITAL | Age: 62
Discharge: HOME | End: 2022-10-05
Attending: EMERGENCY MEDICINE
Payer: MEDICAID

## 2022-10-05 VITALS
TEMPERATURE: 99 F | RESPIRATION RATE: 19 BRPM | WEIGHT: 206.38 LBS | DIASTOLIC BLOOD PRESSURE: 70 MMHG | OXYGEN SATURATION: 99 % | HEIGHT: 63 IN | BODY MASS INDEX: 36.57 KG/M2 | HEART RATE: 78 BPM | SYSTOLIC BLOOD PRESSURE: 132 MMHG

## 2022-10-05 DIAGNOSIS — K59.00 CONSTIPATION: ICD-10-CM

## 2022-10-05 LAB
ALBUMIN SERPL BCP-MCNC: 4 G/DL (ref 3.5–5.2)
ALP SERPL-CCNC: 110 U/L (ref 55–135)
ALT SERPL W/O P-5'-P-CCNC: 18 U/L (ref 10–44)
ANION GAP SERPL CALC-SCNC: 10 MMOL/L (ref 8–16)
AST SERPL-CCNC: 13 U/L (ref 10–40)
BASOPHILS # BLD AUTO: 0.02 K/UL (ref 0–0.2)
BASOPHILS NFR BLD: 0.2 % (ref 0–1.9)
BILIRUB SERPL-MCNC: 0.8 MG/DL (ref 0.1–1)
BILIRUB UR QL STRIP: NEGATIVE
BUN SERPL-MCNC: 11 MG/DL (ref 6–20)
CALCIUM SERPL-MCNC: 9.7 MG/DL (ref 8.7–10.5)
CHLORIDE SERPL-SCNC: 111 MMOL/L (ref 95–110)
CLARITY UR REFRACT.AUTO: CLEAR
CO2 SERPL-SCNC: 24 MMOL/L (ref 23–29)
COLOR UR AUTO: YELLOW
CREAT SERPL-MCNC: 0.7 MG/DL (ref 0.5–1.4)
DIFFERENTIAL METHOD: NORMAL
EOSINOPHIL # BLD AUTO: 0.2 K/UL (ref 0–0.5)
EOSINOPHIL NFR BLD: 2 % (ref 0–8)
ERYTHROCYTE [DISTWIDTH] IN BLOOD BY AUTOMATED COUNT: 12.2 % (ref 11.5–14.5)
EST. GFR  (NO RACE VARIABLE): >60 ML/MIN/1.73 M^2
GLUCOSE SERPL-MCNC: 83 MG/DL (ref 70–110)
GLUCOSE UR QL STRIP: NEGATIVE
HCT VFR BLD AUTO: 40.4 % (ref 37–48.5)
HEP C VIRUS HOLD SPECIMEN: NORMAL
HGB BLD-MCNC: 13.4 G/DL (ref 12–16)
HGB UR QL STRIP: NEGATIVE
IMM GRANULOCYTES # BLD AUTO: 0.02 K/UL (ref 0–0.04)
IMM GRANULOCYTES NFR BLD AUTO: 0.2 % (ref 0–0.5)
KETONES UR QL STRIP: NEGATIVE
LEUKOCYTE ESTERASE UR QL STRIP: NEGATIVE
LYMPHOCYTES # BLD AUTO: 2.4 K/UL (ref 1–4.8)
LYMPHOCYTES NFR BLD: 26.3 % (ref 18–48)
MCH RBC QN AUTO: 29.5 PG (ref 27–31)
MCHC RBC AUTO-ENTMCNC: 33.2 G/DL (ref 32–36)
MCV RBC AUTO: 89 FL (ref 82–98)
MONOCYTES # BLD AUTO: 0.5 K/UL (ref 0.3–1)
MONOCYTES NFR BLD: 5 % (ref 4–15)
NEUTROPHILS # BLD AUTO: 6 K/UL (ref 1.8–7.7)
NEUTROPHILS NFR BLD: 66.3 % (ref 38–73)
NITRITE UR QL STRIP: NEGATIVE
NRBC BLD-RTO: 0 /100 WBC
PH UR STRIP: 6 [PH] (ref 5–8)
PLATELET # BLD AUTO: 225 K/UL (ref 150–450)
PMV BLD AUTO: 10.7 FL (ref 9.2–12.9)
POTASSIUM SERPL-SCNC: 3.7 MMOL/L (ref 3.5–5.1)
PROT SERPL-MCNC: 7.4 G/DL (ref 6–8.4)
PROT UR QL STRIP: NEGATIVE
RBC # BLD AUTO: 4.55 M/UL (ref 4–5.4)
SODIUM SERPL-SCNC: 145 MMOL/L (ref 136–145)
SP GR UR STRIP: 1.01 (ref 1–1.03)
URN SPEC COLLECT METH UR: NORMAL
UROBILINOGEN UR STRIP-ACNC: NEGATIVE EU/DL
WBC # BLD AUTO: 9.11 K/UL (ref 3.9–12.7)

## 2022-10-05 PROCEDURE — 25500020 PHARM REV CODE 255: Mod: ER | Performed by: EMERGENCY MEDICINE

## 2022-10-05 PROCEDURE — 87389 HIV-1 AG W/HIV-1&-2 AB AG IA: CPT | Performed by: EMERGENCY MEDICINE

## 2022-10-05 PROCEDURE — 81003 URINALYSIS AUTO W/O SCOPE: CPT | Mod: ER | Performed by: EMERGENCY MEDICINE

## 2022-10-05 PROCEDURE — 80053 COMPREHEN METABOLIC PANEL: CPT | Mod: ER | Performed by: EMERGENCY MEDICINE

## 2022-10-05 PROCEDURE — 99285 EMERGENCY DEPT VISIT HI MDM: CPT | Mod: 25,ER

## 2022-10-05 PROCEDURE — 25000003 PHARM REV CODE 250: Mod: ER | Performed by: EMERGENCY MEDICINE

## 2022-10-05 PROCEDURE — 86803 HEPATITIS C AB TEST: CPT | Performed by: EMERGENCY MEDICINE

## 2022-10-05 PROCEDURE — 85025 COMPLETE CBC W/AUTO DIFF WBC: CPT | Mod: ER | Performed by: EMERGENCY MEDICINE

## 2022-10-05 RX ORDER — LACTULOSE 10 G/15ML
10 SOLUTION ORAL 2 TIMES DAILY
Qty: 60 ML | Refills: 0 | Status: SHIPPED | OUTPATIENT
Start: 2022-10-05

## 2022-10-05 RX ORDER — LACTULOSE 10 G/15ML
20 SOLUTION ORAL
Status: COMPLETED | OUTPATIENT
Start: 2022-10-05 | End: 2022-10-05

## 2022-10-05 RX ADMIN — IOHEXOL 75 ML: 350 INJECTION, SOLUTION INTRAVENOUS at 06:10

## 2022-10-05 RX ADMIN — IOHEXOL 30 ML: 350 INJECTION, SOLUTION INTRAVENOUS at 06:10

## 2022-10-05 RX ADMIN — LACTULOSE 20 G: 20 SOLUTION ORAL at 07:10

## 2022-10-05 NOTE — ED PROVIDER NOTES
Encounter Date: 10/5/2022       History     Chief Complaint   Patient presents with    Constipation     Pt present to ED with concerns of possible constipation, onset yesterday, LLQ abd pain, OTC meds taken with no relief      The history is provided by the patient.   Constipation   The current episode started today. The problem occurs rarely. The problem has been unchanged. The stool is described as hard. There was no prior successful therapy. There was no prior unsuccessful therapy. Pertinent negatives include no fever, no nausea, no chest pain and no rash.   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  Social History     Tobacco Use    Smoking status: Never    Smokeless tobacco: Never   Substance Use Topics    Alcohol use: No    Drug use: No     Review of Systems   Constitutional:  Negative for fever.   HENT:  Negative for sore throat.    Respiratory:  Negative for shortness of breath.    Cardiovascular:  Negative for chest pain.   Gastrointestinal:  Positive for constipation. Negative for nausea.   Genitourinary:  Negative for dysuria.   Musculoskeletal:  Negative for back pain.   Skin:  Negative for rash.   Neurological:  Negative for weakness.   Hematological:  Does not bruise/bleed easily.     Physical Exam     Initial Vitals [10/05/22 1618]   BP Pulse Resp Temp SpO2   (!) 144/76 87 18 99.3 °F (37.4 °C) 100 %      MAP       --         Physical Exam    Nursing note and vitals reviewed.  Constitutional: She appears  well-developed and well-nourished. No distress.   HENT:   Head: Normocephalic and atraumatic.   Mouth/Throat: Oropharynx is clear and moist.   Eyes: Conjunctivae and EOM are normal. Pupils are equal, round, and reactive to light.   Neck: Neck supple.   Normal range of motion.  Cardiovascular:  Normal rate, regular rhythm and normal heart sounds.     Exam reveals no gallop and no friction rub.       No murmur heard.  Pulmonary/Chest: Breath sounds normal. No respiratory distress. She has no wheezes. She has no rhonchi. She has no rales.   Abdominal: Abdomen is soft. Bowel sounds are normal. She exhibits no distension and no mass. There is no abdominal tenderness. There is no rebound and no guarding.   Musculoskeletal:         General: No tenderness or edema. Normal range of motion.      Cervical back: Normal range of motion and neck supple.     Neurological: She is alert and oriented to person, place, and time. She has normal strength.   Skin: Skin is warm and dry. No rash noted.   Psychiatric: She has a normal mood and affect. Thought content normal.       ED Course   Procedures    Labs Reviewed   COMPREHENSIVE METABOLIC PANEL - Abnormal; Notable for the following components:       Result Value    Chloride 111 (*)     All other components within normal limits   CBC W/ AUTO DIFFERENTIAL   URINALYSIS, REFLEX TO URINE CULTURE    Narrative:     Specimen Source->Urine   HIV 1 / 2 ANTIBODY   HEPATITIS C ANTIBODY   HEP C VIRUS HOLD SPECIMEN          Imaging Results              CT Abdomen Pelvis With Contrast (Final result)  Result time 10/05/22 19:11:54      Final result by Ramón Nunn MD (10/05/22 19:11:54)                   Impression:      Possible constipation.    Additional details as above.    All CT scans at this facility are performed  using dose modulation techniques as appropriate to performed exam including the following:  automated exposure control; adjustment of mA and/or kV according to the patients  size (this includes techniques or standardized protocols for targeted exams where dose is matched to indication/reason for exam: i.e. extremities or head);  iterative reconstruction technique.      Electronically signed by: Ramón Nunn  Date:    10/05/2022  Time:    19:11               Narrative:    EXAMINATION:  CT ABDOMEN PELVIS WITH CONTRAST    CLINICAL HISTORY:  Bowel obstruction suspected;    TECHNIQUE:  Low dose axial images, sagittal and coronal reformations were obtained from the lung bases to the pubic symphysis.  Contrast was administered.  Images acquired after the administration of 75 mL Omnipaque 350 IV contrast and 30 mL Omnipaque 350 oral contrast.    COMPARISON:  None    FINDINGS:  Heart: Normal in size. No pericardial effusion.    Lung Bases: Well aerated, without consolidation or pleural fluid.    Liver: Normal in size and attenuation, with no focal hepatic lesions.    Gallbladder: No calcified gallstones.    Bile Ducts: No evidence of dilated ducts.    Pancreas: No mass or peripancreatic fat stranding.    Spleen: Unremarkable.    Adrenals: Unremarkable.    Kidneys/ Ureters: No hydronephrosis.  No obstructive uropathy.  No nonobstructive nephrolithiasis.    Bladder: No evidence of wall thickening.    Reproductive organs: Unremarkable.    GI Tract/Mesentery: Small bowel appears unremarkable.  Few areas of wall thickening favored to relate to incomplete distension.  Oral contrast passes through the small bowel and into the proximal colon.  Proximal colon is fluid-filled in the distal colon is notable for some scattered inspissated stool.  Correlation for constipation.    Peritoneal Space: No ascites. No free air.    Retroperitoneum: No significant adenopathy.    Abdominal wall: Unremarkable.    Vasculature: No significant atherosclerosis or aneurysm.    Bones: No acute fracture.                                       X-Ray Abdomen Flat And Erect (Final result)  Result time 10/05/22 16:51:16       "Final result by Patricio Durham MD (10/05/22 16:51:16)                   Impression:      Nonspecific scattered air-fluid levels.  Possible low-grade ileus.  See above.      Electronically signed by: Patricio Durham MD  Date:    10/05/2022  Time:    16:51               Narrative:    EXAMINATION:  XR ABDOMEN FLAT AND ERECT    CLINICAL HISTORY:  Constipation, unspecified    COMPARISON:  07/12/2016 abdominal x-ray    FINDINGS:  Lung bases are clear.    No free air is seen.    Scattered air-fluid levels in nondilated bowel is noted.  Possible low-grade ileus.    No significant constipation or fecal impaction.                                           Medications - No data to display  Medical Decision Making:   Clinical Tests:   Lab Tests: Reviewed  Radiological Study: Reviewed  ED Management:  Transition of care from Dr. Jorgensen at 1800    Pt appears comfortable and evaluated post-CT findings. She understands constipation appears to be main issue as described in Rad report. Given dose of Lactulose here and will dose for 2 days to "purge". Pt agreeable to treatment and will followup with PCP for any ongoing issues and to return to ED if symptoms worsen.                         Clinical Impression:   Final diagnoses:  [K59.00] Constipation               Alec Bentley MD  10/05/22 2304       Alec Bentley MD  10/05/22 2304    "

## 2022-10-06 LAB
HCV AB SERPL QL IA: NORMAL
HIV 1+2 AB+HIV1 P24 AG SERPL QL IA: NORMAL

## 2024-01-16 ENCOUNTER — HOSPITAL ENCOUNTER (EMERGENCY)
Facility: HOSPITAL | Age: 64
Discharge: HOME | End: 2024-01-16
Attending: EMERGENCY MEDICINE
Payer: MEDICAID

## 2024-01-16 VITALS
WEIGHT: 219.94 LBS | DIASTOLIC BLOOD PRESSURE: 72 MMHG | HEART RATE: 89 BPM | BODY MASS INDEX: 38.96 KG/M2 | RESPIRATION RATE: 20 BRPM | SYSTOLIC BLOOD PRESSURE: 166 MMHG | TEMPERATURE: 98 F | OXYGEN SATURATION: 99 %

## 2024-01-16 DIAGNOSIS — T16.1XXA ACUTE FOREIGN BODY OF RIGHT EAR CANAL, INITIAL ENCOUNTER: Primary | ICD-10-CM

## 2024-01-16 PROCEDURE — 69200 CLEAR OUTER EAR CANAL: CPT | Mod: RT,ER

## 2024-01-16 PROCEDURE — 99282 EMERGENCY DEPT VISIT SF MDM: CPT | Mod: ER

## 2024-01-16 NOTE — ED PROVIDER NOTES
Encounter Date: 1/16/2024       History     Chief Complaint   Patient presents with    Foreign Body in Ear     Esparza in right ear     The history is provided by the patient.   Otalgia  This is a new problem. The current episode started just prior to arrival. There is pain in the right ear. The problem occurs constantly. The problem has been unchanged. Pain scale: mild. Pertinent negatives include no sore throat and no rash. Associated symptoms comments: Cockroach in right ear..     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  Social History     Tobacco Use    Smoking status: Never    Smokeless tobacco: Never   Substance Use Topics    Alcohol use: No    Drug use: No     Review of Systems   Constitutional:  Negative for fever.   HENT:  Positive for ear pain. Negative for sore throat.    Respiratory:  Negative for shortness of breath.    Cardiovascular:  Negative for chest pain.   Gastrointestinal:  Negative for nausea.   Genitourinary:  Negative for dysuria.   Musculoskeletal:  Negative for back pain.   Skin:  Negative for rash.   Neurological:  Negative for weakness.   Hematological:  Does not bruise/bleed easily.   All other systems reviewed and are negative.      Physical Exam     Initial Vitals [01/16/24 0644]   BP Pulse Resp Temp SpO2   (!) 166/72 89 20 97.6 °F (36.4 °C) 99 %      MAP       --         Physical Exam    Nursing note and vitals reviewed.  Constitutional: She appears well-developed and well-nourished.    HENT:   Head: Normocephalic and atraumatic.   Right Ear: Hearing and tympanic membrane normal. A foreign body (cockroach in right ear, removed with forceps.  no bleeding or discharge.  no overt signs of infection.  TM intact) is present.   Left Ear: Hearing, tympanic membrane, external ear and ear canal normal.   Mouth/Throat: No oropharyngeal exudate.   Eyes: Conjunctivae and EOM are normal. Pupils are equal, round, and reactive to light.   Neck: Neck supple. No thyromegaly present.   Normal range of motion.  Cardiovascular:  Normal rate, regular rhythm, normal heart sounds and intact distal pulses.     Exam reveals no gallop and no friction rub.       No murmur heard.  Pulmonary/Chest: Breath sounds normal. No respiratory distress. She has no wheezes. She has no rhonchi. She exhibits no tenderness.   Abdominal: Abdomen is soft. Bowel sounds are normal. She exhibits no distension. There is no abdominal tenderness. There is no rebound and no guarding.   Musculoskeletal:         General: No tenderness or edema. Normal range of motion.      Cervical back: Normal range of motion and neck supple.     Lymphadenopathy:     She has no cervical adenopathy.   Neurological: She is alert and oriented to person, place, and time. She has normal strength. No cranial nerve deficit or sensory deficit.   Skin: Skin is warm and dry. No rash noted.   Psychiatric: She has a normal mood and affect. Her behavior is normal. Judgment and thought content normal.         ED Course   Procedures  Labs Reviewed - No data to display       Imaging Results    None          Medications - No data to display  Medical Decision Making  Risk  Risk Details: Ddx: cerumen impaction, fb in ear, infection                  Vitals:    01/16/24 0644   BP: (!) 166/72   Pulse: 89   Resp: 20   Temp: 97.6 °F (36.4 °C)   TempSrc: Oral   SpO2: 99%   Weight: 99.7 kg (219 lb 14.5 oz)             Imaging Results    None         Medications - No data to display    7:08  AM - Re-evaluation: The patient is resting comfortably and is in no acute distress. She states that her symptoms have improved after treatment within ER. Discussed test results, shared treatment plan, specific conditions for return, and importance of follow up with patient and family.  Advised close f/u c pcp within one week and return if any issues arise.  She understands and agrees with the plan as discussed. Answered  her questions at this time. She has remained hemodynamically stable throughout the ED course and is appropriate for discharge home.     Pre-hypertension/Hypertension: The pt has been informed that they may have pre-hypertension or hypertension based on a blood pressure reading in the ED. I recommend that the pt call the PCP listed on their discharge instructions or a physician of their choice this week to arrange f/u for further evaluation of possible pre-hypertension or hypertension.     Elaine Penaloza was given a handout which discussed their disease process, precautions, and instructions for follow-up and therapy.     Follow-up Information       [facility]. Schedule an appointment as soon as possible for a visit in 1 week.    Contact information:  [id number]  [place] [address]  [phone]               [facility] - Internal Medicine. Schedule an appointment as soon as possible for a visit in 1 week.    Specialty: Internal Medicine  Contact information:  [address]  [facility] [id number] [phone]  Additional information:  Please park in surface lot and check in at main registration.             [facility] Emergency Dept.    Specialty: Emergency Medicine  Why: As needed, If symptoms worsen  Contact information:  [address]  [facility] [id number] [phone]                              Medication List        ASK your doctor about these medications      albuterol 90 mcg/actuation inhaler  Commonly known as: PROVENTIL/VENTOLIN HFA      amLODIPine 10 MG tablet  Commonly known as: NORVASC     amoxicillin 500 MG capsule  Commonly known as: AMOXIL     atorvastatin 20 MG tablet  Commonly known as: LIPITOR     benzonatate 100 MG capsule  Commonly known as: TESSALON  Take 1 capsule (100 mg total) by mouth 3 (three) times daily as needed for Cough.     chlorthalidone 25 MG Tab  Commonly known as: HYGROTEN     colchicine 0.6 mg tablet  Commonly known as: COLCRYS  Take 2 tablets (1.2 mg) x 1 then 1 tablet (0.6 mg) 1 hour later.     fluticasone propion-salmeteroL 113-14 mcg/actuation Aepb     fluticasone propionate 50 mcg/actuation nasal spray  Commonly known as: FLONASE  2 sprays (100 mcg total) by Each Nare route once daily.     HYDROcodone-acetaminophen 7.5-325 mg per tablet  Commonly known as: NORCO  Take 1 tablet by mouth every 6 (six) hours as needed for Pain.     lactulose 20 gram/30 mL Soln  Commonly known as: CHRONULAC  Take 15 mLs (10 g total) by mouth 2 (two) times daily.     linaGLIPtin 5 mg Tab tablet  Commonly known as: TRADJENTA     loratadine 10 mg tablet  Commonly known as: CLARITIN     losartan 100 MG tablet  Commonly known as: COZAAR     montelukast 10 mg tablet  Commonly known as: SINGULAIR     ondansetron 4 MG Tbdl  Commonly known as: ZOFRAN-ODT  Take 1 tablet (4 mg total) by mouth every 8 (eight) hours as needed (nausea).     pantoprazole 20 MG tablet  Commonly known as: PROTONIX  Take 2 tablets (40 mg total) by mouth once daily.     promethazine 25 MG tablet  Commonly known as: PHENERGAN  Take 1 tablet (25 mg total) by mouth every 6 (six) hours as needed for Nausea.     tazarotene 0.05 % Crea cream  Commonly known as: TAZORAC             Current Discharge Medication List            ED Diagnosis  1. Acute foreign body of right ear canal, initial encounter                            Clinical Impression:  Final diagnoses:  [T16.1XXA] Acute foreign body of right ear canal, initial encounter - cockroach in ear, removed (Primary)           ED Disposition Condition    Discharge Stable          ED Prescriptions    None       Follow-up Information       Follow up With Specialties Details Why Contact Info Additional Information    [facility]  Schedule an appointment as soon as possible for a visit in 1 week  [address] [facility]  [place] [address]  [phone]       [facility] Internal Medicine Internal Medicine Schedule an appointment as soon as possible for a visit in 1 week  [address]  [facility] [id number] [phone] Please park in surface lot and check in at main registration.    [facility] - Emergency Dept Emergency Medicine  As needed, If symptoms worsen [address]  [facility] [id number] [phone]              Edin Pollard Jr., MD  01/16/24 0713